# Patient Record
Sex: MALE | Race: WHITE | NOT HISPANIC OR LATINO | Employment: UNEMPLOYED | URBAN - METROPOLITAN AREA
[De-identification: names, ages, dates, MRNs, and addresses within clinical notes are randomized per-mention and may not be internally consistent; named-entity substitution may affect disease eponyms.]

---

## 2017-01-12 ENCOUNTER — DOCUMENTATION ONLY (OUTPATIENT)
Dept: ENDOCRINOLOGY | Facility: CLINIC | Age: 52
End: 2017-01-12

## 2017-01-12 LAB
ALBUMIN URINE MG/G CR: 4 MG/G CREATININE
ALBUMIN URINE MG/SPEC: 2
CHOLEST SERPL-MCNC: 163 MG/DL
CREAT SERPL-MCNC: 0.76 MG/DL
CREATININE URINE: 50
GFR SERPL CREATININE-BSD FRML MDRD: >90 ML/MIN/1.73M2
HBA1C MFR BLD: 6.4 % (ref 0–5.7)
HDLC SERPL-MCNC: 40 MG/DL
LDLC SERPL CALC-MCNC: 78 MG/DL
NONHDLC SERPL-MCNC: NORMAL MG/DL
TRIGL SERPL-MCNC: 227 MG/DL

## 2017-01-12 NOTE — PROGRESS NOTES
GRADE study visit    Subject is here for 36 month GRADE study visit. Patient is doing well.  Subject reached primary and secondary endpoint and now on Lantus insulin per protocol.  Continues on Metformin 1000 mg bid in addition to randomized treatment of Sitagliptin 100 mg daily and Lantus insulin 50 units daily.      Last A1c - 5.4%    Lab results:   A1c: 6.4  Ur alb/cr = 4  Creatinine = 0.76  Chol 163  Trig 227  HDL  40  LDL  78      Plan:   1. Diabetes- A1c in good range.  Will continue current regimen.   2. Follow up in 3 months, sooner with concerns.        Narinder Ricardo MD  [facility]  Department of Medicine  Division of Endocrinology and Diabetes

## 2017-03-16 LAB — HBA1C MFR BLD: 6.1 % (ref 4.3–6)

## 2017-03-28 ENCOUNTER — DOCUMENTATION ONLY (OUTPATIENT)
Dept: RESEARCH | Facility: CLINIC | Age: 52
End: 2017-03-28

## 2017-06-20 ENCOUNTER — DOCUMENTATION ONLY (OUTPATIENT)
Dept: ENDOCRINOLOGY | Facility: CLINIC | Age: 52
End: 2017-06-20

## 2017-06-20 DIAGNOSIS — Z00.6 RESEARCH SUBJECT: Primary | ICD-10-CM

## 2017-06-20 NOTE — PROGRESS NOTES
GRADE study visit     Subject is here for 42 month GRADE study visit. Patient is doing well.  Subject reached primary and secondary endpoint and now on Lantus insulin per protocol.  Continues on Metformin 1000 mg bid in addition to randomized treatment of Sitagliptin 100 mg daily and Lantus insulin 42 units daily.  No sx of hypoglycemia. Has not been checking bs regularly in AM.     Lab results:   A1c:  Pending     Plan:   1. Diabetes- Will continue current regimen.   2. Follow up in 3 months, sooner with concerns.           Narinder Ricardo MD  [facility]  Department of Medicine  Division of Endocrinology and Diabetes

## 2017-09-25 ENCOUNTER — DOCUMENTATION ONLY (OUTPATIENT)
Dept: ENDOCRINOLOGY | Facility: CLINIC | Age: 52
End: 2017-09-25

## 2017-09-25 LAB — HBA1C MFR BLD: 7 % (ref 0–5.7)

## 2017-09-25 NOTE — PROGRESS NOTES
GRADE study visit    Patient is here for 45 month GRADE study visit. Patient is doing well and continues on Metformin 1000 mg bid in addition to randomized treatment of sitagliptin.  Lantus was added and he is currently on 43 units daily.  Patient is tolerating medications and has no concerns.  Doesn't have low glucose sx.        Plan:   1.  Diabetes- A1c pending today.  Continue current regimen for now with plan to alter as needed based on A1c.    2.  Follow up in 3 months.     Kalani Snider MD  [facility]  Department of Medicine  Division of Endocrinology and Diabetes

## 2017-12-07 ENCOUNTER — DOCUMENTATION ONLY (OUTPATIENT)
Dept: ENDOCRINOLOGY | Facility: CLINIC | Age: 52
End: 2017-12-07

## 2017-12-07 LAB
CHOLEST SERPL-MCNC: 166 MG/DL
CREAT SERPL-MCNC: 0.88 MG/DL
HBA1C MFR BLD: 7.1 % (ref 0–5.7)
HDLC SERPL-MCNC: 40 MG/DL
LDL CHOLESTEROL: 94 MG/DL
Lab: 6.82 MG/G CREATININE (ref 0–25)
TRIGL SERPL-MCNC: 164 MG/DL

## 2017-12-07 NOTE — PROGRESS NOTES
GRADE study visit    Patient is here for 48 month GRADE study visit. Patient is doing well and continues on Metformin 1000 mg bid in addition to randomized treatment of Sitagliptin 100 mg daily.  He was started on glargine when his a1c was at 8.6%, and has been doing well.  He is now on 47 units daily. His last a1c had climbed to 7.0%.  He has not been testing his blood sugars. He is still unemployed and is job hunting.  Eating more when he is home, but he has not gained weight.  He is not reporting hypoglycemia.  He is tolerating medications and has been having no side effects.      Lab results:   A1c:7.1%  LDL- 94  Creatinine- 0.88  Albumin:creatinine ratio 6.82    Plan:   1.  Diabetes- A1c is slightly above target.  Encouraged him to test in the mornings and call with numbers next week.  He thinks some accountability will help him.  No changes today..    2.  Follow up in 3 months.     Emma Martin PA-C, MPAS   [facility]  Department of Medicine  Division of Endocrinology and Diabetes

## 2018-03-08 ENCOUNTER — DOCUMENTATION ONLY (OUTPATIENT)
Dept: ENDOCRINOLOGY | Facility: CLINIC | Age: 53
End: 2018-03-08

## 2018-03-08 LAB — HBA1C MFR BLD: 6.4 % (ref 0–5.7)

## 2018-03-08 NOTE — PROGRESS NOTES
GRADE study visit    Patient is here for 48 month [facility] study visit. Patient is doing well and continues on Metformin 1000 mg bid in addition to randomized treatment of Sitagliptin 100 mg daily.  He was started on glargine when his a1c was at 8.6%, and has been doing well.  He is now on 48 units daily. His last a1c had climbed to 7.1%.  He has not been testing his blood sugars. He is still unemployed and is job hunting.  Eating more when he is home, but he has not gained weight.  He is not reporting hypoglycemia. He has been having pain in his feet and legs that sometimes wakes him up at night.  He is tolerating medications and has been having no side effects.      Lab results:   Hemoglobin a1c: 6.4%     Plan:   1.  Diabetes- A1c is at target. Spent much time discussing dietary interventions including increasing fruits/vegetables/whole grains and reducing processed meats.  No changes today.  2.  Foot pain- normal monofilament and vibratory sensation.  Encouraged more walking and f/u with PCP if pain persists.  He has been prescribed gabapentin, but unclear if he is taking it.  Will explore.     3.  Follow up in 3 months.     Emma Martin PA-C, MPAS   [facility]  Department of Medicine  Division of Endocrinology and Diabetes

## 2018-06-28 ENCOUNTER — DOCUMENTATION ONLY (OUTPATIENT)
Dept: ENDOCRINOLOGY | Facility: CLINIC | Age: 53
End: 2018-06-28

## 2018-06-28 NOTE — PROGRESS NOTES
GRADE study visit    Patient is here for 54 month [facility] study visit. Patient is doing well and continues on Metformin 1000 mg bid in addition to randomized treatment of Sitagliptin 100 mg daily.  He was started on glargine when his a1c was at 8.6%, and has been doing well.  He is now on 47 units daily. His last a1c was 6.4%. He has been feeling very down and finds it hard to get motivated.  He still has not found a job.  He is on an antidepressant for the past 20 years (same one).  His therapist retired and he has not seen anyone in over a year.  He is not suicidal.     Lab results:   Hemoglobin a1c: 6.8%  Albumin:creatinine ratio: 1.96    Plan:   1.  Diabetes- A1c is at target. Spent much time discussing dietary interventions including increasing fruits/vegetables/whole grains and reducing processed meats.  No changes today.  2. Depression- strongly encouraged him to follow up with psychologist/psychiatrist.  Reminded him depression is very common in diabetes and very treatable. He assures me he is not suicidal.     3.  Follow up in 3 months.     Emma Martin PA-C, MPAS   [facility]  Department of Medicine  Division of Endocrinology and Diabetes

## 2018-06-29 LAB — HBA1C MFR BLD: 6.8 % (ref 0–5.7)

## 2018-10-04 ENCOUNTER — DOCUMENTATION ONLY (OUTPATIENT)
Dept: ENDOCRINOLOGY | Facility: CLINIC | Age: 53
End: 2018-10-04

## 2018-10-04 NOTE — PROGRESS NOTES
GRADE study visit    Patient is here for 57 month [facility] study visit. Patient is doing well and continues on Metformin 1000 mg bid in addition to randomized treatment of Sitagliptin 100 mg daily.  He was started on glargine when his a1c was at 8.6%, and has been doing well.  He is now on 47 units daily. His last a1c was 6.8%. He has been feeling very down and finds it hard to get motivated.  He still has not found a job. He recently met with his primary physician and added more medication for his depression.  He is starting it this week.  He continues exercising, walking at least 30 minutes per day.  He wonders if he would benefit from a nicanor sensor.     Lab results:   Hemoglobin a1c: 7.7%    Plan:   1.  Diabetes- A1c is higher at 7.7%.  Previous a1c was 6.8%. Encouraged continued lifestyle changes and reducing time being sedentary (walking breaks every 30 minutes).  Suggested a nicanor would be very helpful for him, but he is not sure about insurance coverage.  Suggested he ask PCP to prescribe it.  For now, will increase Lantus to 50 units.   2. Depression- He is following with his PCP with recent med change.  Encouraged him to establish care with a therapist.    3.  Follow up in 3 months.     Emma Martin PA-C, MPAS   [facility]  Department of Medicine  Division of Endocrinology and Diabetes

## 2018-10-05 LAB — HBA1C MFR BLD: 7.7 % (ref 0–5.6)

## 2019-01-23 ENCOUNTER — DOCUMENTATION ONLY (OUTPATIENT)
Dept: ENDOCRINOLOGY | Facility: CLINIC | Age: 54
End: 2019-01-23

## 2019-01-23 NOTE — PROGRESS NOTES
GRADE study visit    Patient is here for 60 month GRADE study visit. Patient is doing well and continues on Metformin 1000 mg bid in addition to randomized treatment of Sitagliptin 100 mg daily.  He was started on glargine when his a1c was at 8.6%, and has been doing well.  He is now on 50 units daily that was increased at the last visit. His last a1c was 7.7%. He continues to feel down but stable as he has not found a job. Last seen by PCP in August 2018. He continues exercising, walking at least 30 minutes per day.  He checked blood glucoses 2-3 times per week.    Lab results:   Hemoglobin a1c:7.0%  , LDL 38, Chol 209, HDL 38, Cr 0.79  Urine microalbumin 3    Plan:   1.  Diabetes- A1c is 7.0%.  Previous a1c was 7.7%. Discussed with him on checking BG more frequent and possible to get on personal Karen. Encourage him to discus with PCP re: Karen sensor. Encouraged continued lifestyle changes    2. Depression- He is following with his PCP.  Encouraged him to establish care with a therapist.    3. Hyperlipidemia: increased from last year. Encourage him to work on diet and exercise.   3.  Follow up in 3 months.     Monica Genao MD    Division of Diabetes and Endocrinology  Department of Medicine  [phone]

## 2019-01-31 LAB
ALBUMIN URINE MG/G CR: 5.77 MG/G CREATININE
ALBUMIN URINE MG/SPEC: NORMAL
CHOLEST SERPL-MCNC: 209 MG/DL (ref 0–200)
CREAT SERPL-MCNC: 0.79 MG/DL (ref 0.5–1.2)
CREATININE URINE: NORMAL
GFR SERPL CREATININE-BSD FRML MDRD: >90 ML/MIN/1.73M2
HBA1C MFR BLD: 7 % (ref 0–5.6)
HDLC SERPL-MCNC: 38 MG/DL
LDLC SERPL CALC-MCNC: 118 MG/DL (ref 0–129)
NONHDLC SERPL-MCNC: ABNORMAL MG/DL
TRIGL SERPL-MCNC: 268 MG/DL

## 2019-04-15 ENCOUNTER — DOCUMENTATION ONLY (OUTPATIENT)
Dept: ENDOCRINOLOGY | Facility: CLINIC | Age: 54
End: 2019-04-15

## 2019-04-15 LAB — HBA1C MFR BLD: 7.5 % (ref 0–5.6)

## 2019-04-15 NOTE — PROGRESS NOTES
GRADE study visit    Patient is here for 63 month GRADE study visit. Patient is doing well and continues on Metformin 1000 mg bid in addition to randomized treatment of januvia. He is also on lantus 50 units daily..  Patient is tolerating medications and has no concerns.  Hasn't had low bg. AM sugars 110-120.    121/71      Plan:   1.  Diabetes- A1c pending today.  Continue current regimen for now with plan to alter as needed based on A1c.    2.  Follow up in 3 months.     Kalani Snider MD  [facility]  Department of Medicine  Division of Endocrinology and Diabetes

## 2019-06-25 ENCOUNTER — DOCUMENTATION ONLY (OUTPATIENT)
Dept: ENDOCRINOLOGY | Facility: CLINIC | Age: 54
End: 2019-06-25

## 2019-06-25 DIAGNOSIS — Z00.6 RESEARCH SUBJECT: ICD-10-CM

## 2019-06-25 NOTE — PROGRESS NOTES
GRADE study visit     Subject is here for 66 month GRADE study visit. Patient is doing well and continues on Metformin 1000 mg bid in addition to randomized treatment of januvia. He is also on lantus 50 units daily..      Patient is tolerating medications and has no concerns.  Hasn't had low bg. AM sugars 110-115.     Last A1c = 7.5%    Lab today    A1c = 7.6%    Ur alb/cr  3.08        Plan:   1.  Diabetes- Goal to keep A1c < 7.5 and around 7% optimally.  Have some room to incr lantus - incr lantus to 55 units daily.  2.  Follow up in 3 months.     RADHA Ricardo MD

## 2019-07-02 LAB
ALBUMIN URINE MG/G CR: 3.08 MG/G CREATININE
ALBUMIN URINE MG/SPEC: 2
CREATININE URINE: 65
HBA1C MFR BLD: 7.6 % (ref 0–5.6)

## 2019-09-24 ENCOUNTER — DOCUMENTATION ONLY (OUTPATIENT)
Dept: ENDOCRINOLOGY | Facility: CLINIC | Age: 54
End: 2019-09-24

## 2019-09-24 DIAGNOSIS — Z00.6 RESEARCH SUBJECT: ICD-10-CM

## 2019-09-24 NOTE — PROGRESS NOTES
GRADE study visit     Subject is here for 69 month GRADE study visit. Patient is doing well and continues on Metformin 1000 mg bid in addition to randomized treatment of januvia. He is also on lantus - we incr dose after last visit, now on 57 units daily..       Patient is tolerating medications and has no concerns.  Hasn't had low bg but checking fasting bs infreq, when does check says they are around 115 - 120.     Last A1c = 7.6%     Lab today     A1c = 8.2       Plan:   1.  Diabetes- Will incr lantus dose to 65 units daily.  Goal to keep A1c < 7.5   2.  Follow up in 3 months.      RADHA Ricardo MD

## 2019-10-01 LAB — HBA1C MFR BLD: 8.2 % (ref 0–5.6)

## 2019-12-11 ENCOUNTER — DOCUMENTATION ONLY (OUTPATIENT)
Dept: ENDOCRINOLOGY | Facility: CLINIC | Age: 54
End: 2019-12-11

## 2019-12-11 NOTE — PROGRESS NOTES
GRADE study visit     Subject is here for 72 month GRADE study visit. Patient is doing well and continues on Metformin 1000 mg bid in addition to randomized treatment of Januvia. He is also on lantus 65 units daily.     Patient is tolerating medications and has no concerns. He checked FBG ranged 120. He has not checked BG before before bedtime or after meal.    He reports that his diet is not good particularly at lunch when he usually eats out at fast food restaurant. His wife cooks dinner which is usually healthy. He does not walk routinely.     Last A1c = 8.2%     Lab today     Lab Results   Component Value Date    A1C 7.5 12/11/2019        Plan:   1.  Diabetes- A1c today is 7.5% improving. Goal to keep A1c < 7.5. continue current regimen. Encourage diet modification and exercise  2.  Follow up in 3 months.      Monica Genao MD  Division of Diabetes and Endocrinology  Department of Medicine

## 2019-12-12 LAB
ALBUMIN URINE MG/G CR: 8.27 MG/G CREATININE
ALBUMIN URINE MG/SPEC: NORMAL
CHOLEST SERPL-MCNC: 219 MG/DL
CHOLEST/HDLC SERPL: NORMAL {RATIO}
CREAT SERPL-MCNC: 0.79 MG/DL
CREATININE URINE: NORMAL
GFR SERPL CREATININE-BSD FRML MDRD: NORMAL ML/MIN/{1.73_M2}
HBA1C MFR BLD: 7.5 % (ref 0–5.6)
HDLC SERPL-MCNC: 34 MG/DL
LDLC SERPL CALC-MCNC: NORMAL MG/DL
TRIGL SERPL-MCNC: 481 MG/DL

## 2020-04-30 LAB — HBA1C MFR BLD: 6.4 % (ref 0–5.6)

## 2020-05-13 ENCOUNTER — DOCUMENTATION ONLY (OUTPATIENT)
Dept: ENDOCRINOLOGY | Facility: CLINIC | Age: 55
End: 2020-05-13

## 2020-05-13 NOTE — PROGRESS NOTES
[facility] study visit     Talked with subject over the phone due to COVID outbreak for 75 month [facility] study visit. Patient is doing well and continues on Metformin 1000 mg bid in addition to randomized treatment of Januvia. He is also on lantus 65 units daily.     Patient is tolerating medications and has no concerns. He checked FBG ranged 110-120. No hypoglycemia.    He reports working on exercising at the fitness 4-5 times per week.     Last A1c = 7.5%     Lab today  Lab Results   Component Value Date    A1C 6.4 04/29/2020          Plan:   1.  Diabetes- A1c today is 6.4% improving. Goal to keep A1c < 7.5. continue current regimen. Encourage diet modification and exercise  2.  Follow up in 3 months.      Monica Genao MD  Division of Diabetes and Endocrinology  Department of Medicine

## 2020-10-07 ENCOUNTER — DOCUMENTATION ONLY (OUTPATIENT)
Dept: ENDOCRINOLOGY | Facility: CLINIC | Age: 55
End: 2020-10-07

## 2020-10-07 LAB
ALBUMIN URINE MG/G CR: 6.98 MG/G CREATININE
ALBUMIN URINE MG/SPEC: NORMAL
CREATININE URINE: NORMAL
HBA1C MFR BLD: 6.1 % (ref 0–5.6)

## 2020-10-07 NOTE — PROGRESS NOTES
GRADE study visit     Patient is here for a 78 month GRADE study visit. Patient is doing well and continues on Metformin 1000 mg bid in addition to randomized treatment of Januvia. He is also on Lantus 65 units daily.     Patient is tolerating medications and has no concerns. He has not checked BG regularly. No hypoglycemic symptoms.    Last A1c = 6.4%     Lab today  A1c 6.1%     Plan:   1.  Diabetes- A1c . Goal to keep A1c < 7.5. continue current regimen. Encourage diet modification and exercise  2.  Follow up in 3 months.      Monica Genao MD  Division of Diabetes and Endocrinology  Department of Medicine

## 2020-10-30 ENCOUNTER — APPOINTMENT (OUTPATIENT)
Dept: URBAN - METROPOLITAN AREA CLINIC 257 | Age: 55
Setting detail: DERMATOLOGY
End: 2020-10-30

## 2020-10-30 ENCOUNTER — TRANSFERRED RECORDS (OUTPATIENT)
Dept: HEALTH INFORMATION MANAGEMENT | Facility: CLINIC | Age: 55
End: 2020-10-30

## 2020-10-30 DIAGNOSIS — R21 RASH AND OTHER NONSPECIFIC SKIN ERUPTION: ICD-10-CM

## 2020-10-30 DIAGNOSIS — R20.8 OTHER DISTURBANCES OF SKIN SENSATION: ICD-10-CM

## 2020-10-30 DIAGNOSIS — L57.8 OTHER SKIN CHANGES DUE TO CHRONIC EXPOSURE TO NONIONIZING RADIATION: ICD-10-CM

## 2020-10-30 PROCEDURE — OTHER BIOPSY BY PUNCH METHOD: OTHER

## 2020-10-30 PROCEDURE — OTHER PRESCRIPTION: OTHER

## 2020-10-30 PROCEDURE — 11104 PUNCH BX SKIN SINGLE LESION: CPT

## 2020-10-30 PROCEDURE — 99203 OFFICE O/P NEW LOW 30 MIN: CPT | Mod: 25

## 2020-10-30 PROCEDURE — OTHER MIPS QUALITY: OTHER

## 2020-10-30 PROCEDURE — OTHER COUNSELING: OTHER

## 2020-10-30 RX ORDER — TRIAMCINOLONE ACETONIDE 1 MG/G
CREAM TOPICAL BID
Qty: 1 | Refills: 3 | Status: ERX | COMMUNITY
Start: 2020-10-30

## 2020-10-30 ASSESSMENT — LOCATION DETAILED DESCRIPTION DERM
LOCATION DETAILED: RIGHT CENTRAL MALAR CHEEK
LOCATION DETAILED: LEFT INFERIOR POSTERIOR NECK

## 2020-10-30 ASSESSMENT — LOCATION SIMPLE DESCRIPTION DERM
LOCATION SIMPLE: RIGHT CHEEK
LOCATION SIMPLE: POSTERIOR NECK

## 2020-10-30 ASSESSMENT — LOCATION ZONE DERM
LOCATION ZONE: NECK
LOCATION ZONE: FACE

## 2020-10-30 NOTE — PROCEDURE: BIOPSY BY PUNCH METHOD
Patient Will Remove Sutures At Home?: No
X Depth Of Punch In Cm (Optional): 0
Anesthesia Type: 1% lidocaine with epinephrine
Dressing: bandage
Billing Type: Third-Party Bill
Detail Level: Detailed
Punch Size In Mm: 4
Wound Care: Petrolatum
Render Post-Care Instructions In Note?: yes
Body Location Override (Optional - Billing Will Still Be Based On Selected Body Map Location If Applicable): left posterior neck
Post-Care Instructions: I reviewed with the patient in detail post-care instructions. Patient is to keep the biopsy site dry overnight, and then apply bacitracin twice daily until healed. Patient may apply hydrogen peroxide soaks to remove any crusting.
Information: Selecting Yes will display possible errors in your note based on the variables you have selected. This validation is only offered as a suggestion for you. PLEASE NOTE THAT THE VALIDATION TEXT WILL BE REMOVED WHEN YOU FINALIZE YOUR NOTE. IF YOU WANT TO FAX A PRELIMINARY NOTE YOU WILL NEED TO TOGGLE THIS TO 'NO' IF YOU DO NOT WANT IT IN YOUR FAXED NOTE.
Hemostasis: None
Anesthesia Volume In Cc (Will Not Render If 0): 0.5
Epidermal Sutures: gel foam
Consent: Written consent was obtained and risks were reviewed including but not limited to scarring, infection, bleeding, scabbing, incomplete removal, nerve damage and allergy to anesthesia.
Home Suture Removal Text: Patient was provided a home suture removal kit and will remove their sutures at home.  If they have any questions or difficulties they will call the office.
Size Of Lesion In Cm (Optional): 0.4
Biopsy Type: H and E
Notification Instructions: Patient will be notified of biopsy results. However, patient instructed to call the office if not contacted within 2 weeks.

## 2020-10-30 NOTE — PROCEDURE: COUNSELING
Patient Specific Counseling (Will Not Stick From Patient To Patient): \\nI suspect this is sclerodema (not scleroderma) which can be associated with DM.  We will biopsy to confirm.  Will start triam 0.1% bid for now empirically.  It does not seem to bother him much, but if it progressed or became more symptomatic, we could entertain tranilast (0.3 g/day), which has been shown in some reports to improve thickness/induration.
Detail Level: Detailed
Detail Level: Simple

## 2020-10-30 NOTE — PROCEDURE: MIPS QUALITY
Quality 130: Documentation Of Current Medications In The Medical Record: Current Medications Documented
Quality 226: Preventive Care And Screening: Tobacco Use: Screening And Cessation Intervention: Patient screened for tobacco use and is an ex/non-smoker
Quality 111:Pneumonia Vaccination Status For Older Adults: Pneumococcal Vaccination not Administered or Previously Received, Reason not Otherwise Specified
Detail Level: Detailed
Quality 110: Preventive Care And Screening: Influenza Immunization: Influenza Immunization Ordered or Recommended, but not Administered due to system reason
Quality 431: Preventive Care And Screening: Unhealthy Alcohol Use - Screening: Patient screened for unhealthy alcohol use using a single question and scores less than 2 times per year

## 2020-11-11 ENCOUNTER — APPOINTMENT (OUTPATIENT)
Dept: URBAN - METROPOLITAN AREA CLINIC 259 | Age: 55
Setting detail: DERMATOLOGY
End: 2020-11-11

## 2020-11-11 ENCOUNTER — TRANSFERRED RECORDS (OUTPATIENT)
Dept: HEALTH INFORMATION MANAGEMENT | Facility: CLINIC | Age: 55
End: 2020-11-11

## 2020-11-11 DIAGNOSIS — L94.0 LOCALIZED SCLERODERMA [MORPHEA]: ICD-10-CM

## 2020-11-11 PROCEDURE — OTHER VENIPUNCTURE: OTHER

## 2020-11-11 PROCEDURE — 36415 COLL VENOUS BLD VENIPUNCTURE: CPT

## 2020-11-11 PROCEDURE — OTHER ORDER TESTS: OTHER

## 2021-01-06 ENCOUNTER — DOCUMENTATION ONLY (OUTPATIENT)
Dept: ENDOCRINOLOGY | Facility: CLINIC | Age: 56
End: 2021-01-06

## 2021-01-06 NOTE — PROGRESS NOTES
GRADE study final visit     Patient is here for a 84 month and final study visit. Patient is doing well and continues on Metformin 1000 mg bid in addition to randomized treatment of Januvia. He is also on Lantus 65 units daily. He does not check BG often, only once a week. BG ~120.     Patient is tolerating medications and has no concerns. No hypoglycemic symptoms.    He has not walked much during winter. Diet is not good during holiday.    Last A1c = 6.1%     Lab today  A1c 7.6%     Plan:   1.  Diabetes- A1c is up. Goal to keep A1c < 7.5. continue current regimen. Encourage diet modification and exercise  2.  Follow up with PCP in one month     Monica Genao MD  Division of Diabetes and Endocrinology  Department of Medicine

## 2021-01-07 LAB
CHOLEST SERPL-MCNC: 228 MG/DL (ref 0–200)
CREAT SERPL-MCNC: 0.76 MG/DL (ref 0.67–1.17)
GFR SERPL CREATININE-BSD FRML MDRD: >90 ML/MIN/1.73M2
HBA1C MFR BLD: 7.6 % (ref 0–5.6)
HDLC SERPL-MCNC: 40 MG/DL
LDLC SERPL CALC-MCNC: 131 MG/DL (ref 0–129)
NONHDLC SERPL-MCNC: ABNORMAL MG/DL
TRIGL SERPL-MCNC: 283 MG/DL

## 2021-01-14 LAB
ALBUMIN URINE MG/G CR: 5.13 MG/G CREATININE
ALBUMIN URINE MG/SPEC: NORMAL
CREATININE URINE: NORMAL

## 2021-10-21 ENCOUNTER — APPOINTMENT (OUTPATIENT)
Dept: URBAN - METROPOLITAN AREA CLINIC 257 | Age: 56
Setting detail: DERMATOLOGY
End: 2021-10-21

## 2021-10-21 ENCOUNTER — TRANSFERRED RECORDS (OUTPATIENT)
Dept: HEALTH INFORMATION MANAGEMENT | Facility: CLINIC | Age: 56
End: 2021-10-21

## 2021-10-21 DIAGNOSIS — D17 BENIGN LIPOMATOUS NEOPLASM: ICD-10-CM

## 2021-10-21 DIAGNOSIS — L94.0 LOCALIZED SCLERODERMA [MORPHEA]: ICD-10-CM

## 2021-10-21 PROBLEM — D17.21 BENIGN LIPOMATOUS NEOPLASM OF SKIN AND SUBCUTANEOUS TISSUE OF RIGHT ARM: Status: ACTIVE | Noted: 2021-10-21

## 2021-10-21 PROCEDURE — OTHER PRESCRIPTION: OTHER

## 2021-10-21 PROCEDURE — 99214 OFFICE O/P EST MOD 30 MIN: CPT

## 2021-10-21 PROCEDURE — OTHER COUNSELING: OTHER

## 2021-10-21 PROCEDURE — OTHER MIPS QUALITY: OTHER

## 2021-10-21 RX ORDER — TRIAMCINOLONE ACETONIDE 1 MG/G
CREAM TOPICAL BID
Qty: 454 | Refills: 3 | Status: ERX | COMMUNITY
Start: 2021-10-21

## 2021-10-21 ASSESSMENT — LOCATION ZONE DERM
LOCATION ZONE: ARM
LOCATION ZONE: NECK

## 2021-10-21 ASSESSMENT — LOCATION SIMPLE DESCRIPTION DERM
LOCATION SIMPLE: POSTERIOR NECK
LOCATION SIMPLE: RIGHT FOREARM

## 2021-10-21 ASSESSMENT — LOCATION DETAILED DESCRIPTION DERM
LOCATION DETAILED: RIGHT VENTRAL PROXIMAL FOREARM
LOCATION DETAILED: MID POSTERIOR NECK

## 2021-10-21 NOTE — PROCEDURE: MIPS QUALITY
Quality 130: Documentation Of Current Medications In The Medical Record: Current Medications Documented
Quality 431: Preventive Care And Screening: Unhealthy Alcohol Use - Screening: Patient not identified as an unhealthy alcohol user when screened for unhealthy alcohol use using a systematic screening method
Quality 226: Preventive Care And Screening: Tobacco Use: Screening And Cessation Intervention: Patient screened for tobacco use and is an ex/non-smoker
Quality 110: Preventive Care And Screening: Influenza Immunization: Influenza Immunization Ordered or Recommended, but not Administered due to system reason
Detail Level: Detailed

## 2022-01-12 ENCOUNTER — TRANSFERRED RECORDS (OUTPATIENT)
Dept: MULTI SPECIALTY CLINIC | Facility: CLINIC | Age: 57
End: 2022-01-12

## 2022-01-12 LAB — RETINOPATHY: NORMAL

## 2022-07-19 ENCOUNTER — OFFICE VISIT (OUTPATIENT)
Dept: FAMILY MEDICINE | Facility: CLINIC | Age: 57
End: 2022-07-19
Payer: COMMERCIAL

## 2022-07-19 VITALS
WEIGHT: 200 LBS | HEART RATE: 92 BPM | DIASTOLIC BLOOD PRESSURE: 75 MMHG | SYSTOLIC BLOOD PRESSURE: 122 MMHG | HEIGHT: 69 IN | TEMPERATURE: 97.9 F | OXYGEN SATURATION: 99 % | BODY MASS INDEX: 29.62 KG/M2

## 2022-07-19 DIAGNOSIS — E11.9 TYPE 2 DIABETES MELLITUS WITHOUT COMPLICATION, WITHOUT LONG-TERM CURRENT USE OF INSULIN (H): ICD-10-CM

## 2022-07-19 DIAGNOSIS — G47.33 OBSTRUCTIVE SLEEP APNEA SYNDROME: ICD-10-CM

## 2022-07-19 DIAGNOSIS — R94.31 ABNORMAL ELECTROCARDIOGRAM: ICD-10-CM

## 2022-07-19 DIAGNOSIS — M75.101 TEAR OF RIGHT ROTATOR CUFF, UNSPECIFIED TEAR EXTENT, UNSPECIFIED WHETHER TRAUMATIC: ICD-10-CM

## 2022-07-19 DIAGNOSIS — Z01.818 PREOP EXAM FOR INTERNAL MEDICINE: Primary | ICD-10-CM

## 2022-07-19 PROBLEM — I82.621 ACUTE DEEP VEIN THROMBOSIS (DVT) OF RIGHT UPPER EXTREMITY (H): Status: ACTIVE | Noted: 2022-07-19

## 2022-07-19 LAB
ANION GAP SERPL CALCULATED.3IONS-SCNC: 9 MMOL/L (ref 3–14)
BASOPHILS # BLD AUTO: 0 10E3/UL (ref 0–0.2)
BASOPHILS NFR BLD AUTO: 1 %
BUN SERPL-MCNC: 16 MG/DL (ref 7–30)
CALCIUM SERPL-MCNC: 9.8 MG/DL (ref 8.5–10.1)
CHLORIDE BLD-SCNC: 99 MMOL/L (ref 94–109)
CO2 SERPL-SCNC: 24 MMOL/L (ref 20–32)
CREAT SERPL-MCNC: 0.83 MG/DL (ref 0.66–1.25)
EOSINOPHIL # BLD AUTO: 0.2 10E3/UL (ref 0–0.7)
EOSINOPHIL NFR BLD AUTO: 4 %
ERYTHROCYTE [DISTWIDTH] IN BLOOD BY AUTOMATED COUNT: 12.9 % (ref 10–15)
GFR SERPL CREATININE-BSD FRML MDRD: >90 ML/MIN/1.73M2
GLUCOSE BLD-MCNC: 513 MG/DL (ref 70–99)
HBA1C MFR BLD: 12.3 % (ref 0–5.6)
HCT VFR BLD AUTO: 44.9 % (ref 40–53)
HGB BLD-MCNC: 15.3 G/DL (ref 13.3–17.7)
IMM GRANULOCYTES # BLD: 0 10E3/UL
IMM GRANULOCYTES NFR BLD: 1 %
LYMPHOCYTES # BLD AUTO: 1.6 10E3/UL (ref 0.8–5.3)
LYMPHOCYTES NFR BLD AUTO: 33 %
MCH RBC QN AUTO: 30.8 PG (ref 26.5–33)
MCHC RBC AUTO-ENTMCNC: 34.1 G/DL (ref 31.5–36.5)
MCV RBC AUTO: 91 FL (ref 78–100)
MONOCYTES # BLD AUTO: 0.4 10E3/UL (ref 0–1.3)
MONOCYTES NFR BLD AUTO: 8 %
NEUTROPHILS # BLD AUTO: 2.7 10E3/UL (ref 1.6–8.3)
NEUTROPHILS NFR BLD AUTO: 54 %
PLATELET # BLD AUTO: 272 10E3/UL (ref 150–450)
POTASSIUM BLD-SCNC: 4.6 MMOL/L (ref 3.4–5.3)
RBC # BLD AUTO: 4.96 10E6/UL (ref 4.4–5.9)
SODIUM SERPL-SCNC: 132 MMOL/L (ref 133–144)
WBC # BLD AUTO: 5 10E3/UL (ref 4–11)

## 2022-07-19 PROCEDURE — 36415 COLL VENOUS BLD VENIPUNCTURE: CPT | Performed by: INTERNAL MEDICINE

## 2022-07-19 PROCEDURE — 80048 BASIC METABOLIC PNL TOTAL CA: CPT | Performed by: INTERNAL MEDICINE

## 2022-07-19 PROCEDURE — 83036 HEMOGLOBIN GLYCOSYLATED A1C: CPT | Performed by: INTERNAL MEDICINE

## 2022-07-19 PROCEDURE — 85025 COMPLETE CBC W/AUTO DIFF WBC: CPT | Performed by: INTERNAL MEDICINE

## 2022-07-19 PROCEDURE — 93000 ELECTROCARDIOGRAM COMPLETE: CPT | Performed by: INTERNAL MEDICINE

## 2022-07-19 PROCEDURE — 99204 OFFICE O/P NEW MOD 45 MIN: CPT | Performed by: INTERNAL MEDICINE

## 2022-07-19 ASSESSMENT — PAIN SCALES - GENERAL: PAINLEVEL: NO PAIN (0)

## 2022-07-19 NOTE — PATIENT INSTRUCTIONS
SCHEDULE Echo (see below)    LABS today    IF the a1c is much above 8 we need to consider additional medication and perhaps postponing surgery for a little while.      ECHO:  [facility]  [phone]  (They're just across the street in the main hospital building)

## 2022-07-19 NOTE — PROGRESS NOTES
[address], [address]  [facility] [address]  Phone: [phone]  Primary Provider: Kelvin Moya  Pre-op Performing Provider: JEREMIAH LOYA      PREOPERATIVE EVALUATION:  Today's date: 7/19/2022    Bro Barton is a 57 year old male who presents for a preoperative evaluation.    Surgical Information:  Surgery/Procedure: Torn Rotator cuff, Biceps - Right Arm  Surgery Location: [facility]  Surgeon: Dr Martin  Surgery Date: 7/26/22  Time of Surgery: TBD  Where patient plans to recover: At home with family  Fax number for surgical facility: [phone]    Type of Anesthesia Anticipated: to be determined    Assessment & Plan     The proposed surgical procedure is considered INTERMEDIATE risk.    Preop exam for internal medicine  Routine labs in process.  His a1c is substantially high at 12.3.  Given this is an elective procedure would like to see evidence of blood sugars under better control prior to surgery.  MTM referral placed.  Input much appreciated.    - BASIC METABOLIC PANEL  - HEMOGLOBIN A1C  - CBC with platelets and differential  - EKG 12-lead complete w/read - Clinics  - BASIC METABOLIC PANEL  - HEMOGLOBIN A1C  - CBC with platelets and differential    Tear of right rotator cuff, unspecified tear extent, unspecified whether traumatic  As above.      Type 2 diabetes mellitus without complication, without long-term current use of insulin (H)  Labs today.  I would want to see him 3 months at the latest for ongoing DM management.  Will work with MTM in the short term.      Obstructive sleep apnea syndrome  On CPAP.  Noted.      Abnormal electrocardiogram  RBB and fascicular block. Will proceed with Echo  - Echocardiogram Complete           Risks and Recommendations:  The patient has the following additional risks and recommendations for perioperative complications:   - No identified additional risk factors other than previously  addressed        RECOMMENDATION:  Surgery is NOT recommended due to uncontrolled diabetes. Stabilization required prior to elective surgery.       54 minutes spent on the date of the encounter doing chart review, history and exam, documentation and further activities per the note        Subjective     HPI related to upcoming procedure:   New to me as well as the clinic and system.    Fell off a ladder last year and tore rotator cuff.      No persona h/o heart disease.  Does have DM.  Routine walking 3 miles every other day    Not checking BS at home.        Preop Questions 7/19/2022   1. Have you ever had a heart attack or stroke? No   2. Have you ever had surgery on your heart or blood vessels, such as a stent placement, a coronary artery bypass, or surgery on an artery in your head, neck, heart, or legs? No   3. Do you have chest pain with activity? No   4. Do you have a history of  heart failure? No   5. Do you currently have a cold, bronchitis or symptoms of other infection? No   6. Do you have a cough, shortness of breath, or wheezing? No   7. Do you or anyone in your family have previous history of blood clots? YES - RUE, spontaneous, no blood thinner   8. Do you or does anyone in your family have a serious bleeding problem such as prolonged bleeding following surgeries or cuts? No   9. Have you ever had problems with anemia or been told to take iron pills? No   10. Have you had any abnormal blood loss such as black, tarry or bloody stools? No   11. Have you ever had a blood transfusion? No   12. Are you willing to have a blood transfusion if it is medically needed before, during, or after your surgery? Yes   13. Have you or any of your relatives ever had problems with anesthesia? No   14. Do you have sleep apnea, excessive snoring or daytime drowsiness? YES - (uses CPAP)   14a. Do you have a CPAP machine? Yes   15. Do you have any artifical heart valves or other implanted medical devices like a pacemaker,  defibrillator, or continuous glucose monitor? No   16. Do you have artificial joints? No   17. Are you allergic to latex? No       Preoperative Review of :   reviewed - no record of controlled substances prescribed.          Review of Systems  Constitutional, neuro, ENT, endocrine, pulmonary, cardiac, gastrointestinal, genitourinary, musculoskeletal, integument and psychiatric systems are negative, except as otherwise noted.    Patient Active Problem List    Diagnosis Date Noted     Acute deep vein thrombosis (DVT) of right upper extremity (H) 07/19/2022     Priority: Medium     Spontaneously.  No blood thinners.  Resolved with time       Obstructive sleep apnea syndrome 09/22/2017     Priority: Medium     Dx about 2008.  Compliant with CPAP       History of kidney stones 09/07/2016     Priority: Medium     Type 2 diabetes mellitus without complication (H) 07/06/2016     Priority: Medium     Dx around 2010. Was in a study until 2022.  During study was on metformin, januvia and lantus.  At present (7/2022 on metformin only)       Major depressive disorder, recurrent episode, moderate (H) 11/10/2009     Priority: Medium      Past Medical History:   Diagnosis Date     Depressive disorder      Diabetes mellitus (H)      Past Surgical History:   Procedure Laterality Date     GENITOURINARY SURGERY       Current Outpatient Medications   Medication Sig Dispense Refill     atorvastatin (LIPITOR) 40 MG tablet Take 1 tablet (40 mg) by mouth daily 90 tablet 3     metFORMIN (GLUCOPHAGE) 500 MG tablet Take 2 tablets (1,000 mg) by mouth 2 times daily (with meals)       PARoxetine (PAXIL) 20 MG tablet Take 1 tablet (20 mg) by mouth every morning 90 tablet 3       No Known Allergies     Social History     Tobacco Use     Smoking status: Never Smoker     Smokeless tobacco: Never Used   Substance Use Topics     Alcohol use: No       History   Drug Use No         Objective     /75 (BP Location: Right arm, Patient Position:  "Sitting, Cuff Size: Adult Regular)   Pulse 92   Temp 97.9  F (36.6  C) (Temporal)   Ht 1.753 m (5' 9\")   Wt 90.7 kg (200 lb)   SpO2 99%   BMI 29.53 kg/m      Physical Exam    GENERAL APPEARANCE: healthy, alert and no distress     EYES: EOMI,  PERRL     HENT: ear canals and TM's normal and nose and mouth without ulcers or lesions     NECK: no adenopathy, no asymmetry, masses, or scars and thyroid normal to palpation     RESP: lungs clear to auscultation - no rales, rhonchi or wheezes     CV: regular rates and rhythm, normal S1 S2, no S3 or S4 and no murmur, click or rub     ABDOMEN:  soft, nontender, no HSM or masses and bowel sounds normal     MS: extremities normal- no gross deformities noted, no evidence of inflammation in joints, FROM in all extremities.     SKIN: no suspicious lesions or rashes     NEURO: Normal strength and tone, sensory exam grossly normal, mentation intact and speech normal     PSYCH: mentation appears normal. and affect normal/bright     LYMPHATICS: No cervical adenopathy    Recent Labs   Lab Test 01/06/21  0000 10/07/20  0000   CR 0.76  --    A1C 7.6* 6.1*        Diagnostics:  Labs pending at this time.  Results will be reviewed when available.   EKG: RBB, anterior block,    Revised Cardiac Risk Index (RCRI):  The patient has the following serious cardiovascular risks for perioperative complications:   - Diabetes Mellitus (on Insulin) = 1 point     RCRI Interpretation: 1 point: Class II (low risk - 0.9% complication rate)           Signed Electronically by: Maciej Hopkins MD  Copy of this evaluation report is provided to requesting physician.      "

## 2022-07-20 ENCOUNTER — MYC MEDICAL ADVICE (OUTPATIENT)
Dept: FAMILY MEDICINE | Facility: CLINIC | Age: 57
End: 2022-07-20

## 2022-07-20 ENCOUNTER — HOSPITAL ENCOUNTER (OUTPATIENT)
Dept: CARDIOLOGY | Facility: CLINIC | Age: 57
Discharge: HOME | End: 2022-07-20
Attending: INTERNAL MEDICINE | Admitting: INTERNAL MEDICINE
Payer: COMMERCIAL

## 2022-07-20 DIAGNOSIS — R94.31 ABNORMAL ELECTROCARDIOGRAM: ICD-10-CM

## 2022-07-20 LAB — LVEF ECHO: NORMAL

## 2022-07-20 PROCEDURE — 93306 TTE W/DOPPLER COMPLETE: CPT | Mod: 26 | Performed by: INTERNAL MEDICINE

## 2022-07-20 PROCEDURE — 255N000002 HC RX 255 OP 636: Performed by: INTERNAL MEDICINE

## 2022-07-20 PROCEDURE — 999N000208 ECHOCARDIOGRAM COMPLETE

## 2022-07-20 RX ADMIN — HUMAN ALBUMIN MICROSPHERES AND PERFLUTREN 3 ML: 10; .22 INJECTION, SOLUTION INTRAVENOUS at 14:24

## 2022-07-22 ENCOUNTER — LAB (OUTPATIENT)
Dept: LAB | Facility: CLINIC | Age: 57
End: 2022-07-22
Payer: COMMERCIAL

## 2022-07-22 ENCOUNTER — OFFICE VISIT (OUTPATIENT)
Dept: PHARMACY | Facility: CLINIC | Age: 57
End: 2022-07-22
Attending: INTERNAL MEDICINE
Payer: COMMERCIAL

## 2022-07-22 VITALS
SYSTOLIC BLOOD PRESSURE: 124 MMHG | DIASTOLIC BLOOD PRESSURE: 82 MMHG | WEIGHT: 200.5 LBS | HEART RATE: 74 BPM | BODY MASS INDEX: 29.61 KG/M2

## 2022-07-22 DIAGNOSIS — F33.1 MAJOR DEPRESSIVE DISORDER, RECURRENT EPISODE, MODERATE (H): ICD-10-CM

## 2022-07-22 DIAGNOSIS — E11.9 TYPE 2 DIABETES MELLITUS WITHOUT COMPLICATION, WITHOUT LONG-TERM CURRENT USE OF INSULIN (H): ICD-10-CM

## 2022-07-22 DIAGNOSIS — E11.9 TYPE 2 DIABETES MELLITUS WITHOUT COMPLICATION, WITHOUT LONG-TERM CURRENT USE OF INSULIN (H): Primary | ICD-10-CM

## 2022-07-22 LAB
CREAT UR-MCNC: 52 MG/DL
MICROALBUMIN UR-MCNC: <5 MG/L
MICROALBUMIN/CREAT UR: NORMAL MG/G{CREAT}

## 2022-07-22 PROCEDURE — 99607 MTMS BY PHARM ADDL 15 MIN: CPT | Performed by: PHARMACIST

## 2022-07-22 PROCEDURE — 99605 MTMS BY PHARM NP 15 MIN: CPT | Performed by: PHARMACIST

## 2022-07-22 PROCEDURE — 82043 UR ALBUMIN QUANTITATIVE: CPT | Performed by: PATHOLOGY

## 2022-07-22 RX ORDER — TIRZEPATIDE 2.5 MG/.5ML
2.5 INJECTION, SOLUTION SUBCUTANEOUS WEEKLY
Qty: 2 ML | Refills: 0 | Status: SHIPPED | OUTPATIENT
Start: 2022-07-22 | End: 2022-08-22

## 2022-07-22 RX ORDER — TIRZEPATIDE 5 MG/.5ML
5 INJECTION, SOLUTION SUBCUTANEOUS WEEKLY
Qty: 2 ML | Refills: 1 | Status: SHIPPED | OUTPATIENT
Start: 2022-07-22 | End: 2022-09-21

## 2022-07-22 RX ORDER — INSULIN GLARGINE 100 [IU]/ML
20 INJECTION, SOLUTION SUBCUTANEOUS AT BEDTIME
Qty: 30 ML | Refills: 1 | Status: SHIPPED | OUTPATIENT
Start: 2022-07-22 | End: 2022-12-27

## 2022-07-22 RX ORDER — VENLAFAXINE HYDROCHLORIDE 150 MG/1
150 CAPSULE, EXTENDED RELEASE ORAL DAILY
COMMUNITY
Start: 2022-06-09 | End: 2023-06-08

## 2022-07-22 RX ORDER — METFORMIN HCL 500 MG
500 TABLET, EXTENDED RELEASE 24 HR ORAL 2 TIMES DAILY WITH MEALS
COMMUNITY
End: 2023-06-08

## 2022-07-22 RX ORDER — TRAZODONE HYDROCHLORIDE 100 MG/1
100 TABLET ORAL AT BEDTIME
COMMUNITY
Start: 2022-06-09 | End: 2023-06-08

## 2022-07-22 RX ORDER — PEN NEEDLE, DIABETIC 32GX 5/32"
NEEDLE, DISPOSABLE MISCELLANEOUS
Qty: 100 EACH | Refills: 3 | Status: SHIPPED | OUTPATIENT
Start: 2022-07-22 | End: 2022-12-30

## 2022-07-22 ASSESSMENT — ANXIETY QUESTIONNAIRES
4. TROUBLE RELAXING: MORE THAN HALF THE DAYS
1. FEELING NERVOUS, ANXIOUS, OR ON EDGE: MORE THAN HALF THE DAYS
GAD7 TOTAL SCORE: 12
IF YOU CHECKED OFF ANY PROBLEMS ON THIS QUESTIONNAIRE, HOW DIFFICULT HAVE THESE PROBLEMS MADE IT FOR YOU TO DO YOUR WORK, TAKE CARE OF THINGS AT HOME, OR GET ALONG WITH OTHER PEOPLE: SOMEWHAT DIFFICULT
2. NOT BEING ABLE TO STOP OR CONTROL WORRYING: MORE THAN HALF THE DAYS
7. FEELING AFRAID AS IF SOMETHING AWFUL MIGHT HAPPEN: SEVERAL DAYS
3. WORRYING TOO MUCH ABOUT DIFFERENT THINGS: MORE THAN HALF THE DAYS
6. BECOMING EASILY ANNOYED OR IRRITABLE: MORE THAN HALF THE DAYS
GAD7 TOTAL SCORE: 12
5. BEING SO RESTLESS THAT IT IS HARD TO SIT STILL: SEVERAL DAYS

## 2022-07-22 ASSESSMENT — PATIENT HEALTH QUESTIONNAIRE - PHQ9: SUM OF ALL RESPONSES TO PHQ QUESTIONS 1-9: 12

## 2022-07-22 NOTE — PATIENT INSTRUCTIONS
"Recommendations from today's MTM visit:                                                    MTM (medication therapy management) is a service provided by a clinical pharmacist designed to help you get the most of out of your medicines.   Today we reviewed what your medicines are for, how to know if they are working, that your medicines are safe and how to make your medicine regimen as easy as possible.      1. Take half an amitriptyline once daily for two weeks, then one half tablet every other day for a week and then stop. Increase again if feeling withdrawal symptoms.   2. Target to decrease carbs to less than 130 grams daily. You can also search for the glycemic index of different foods. A glycemic index above 70 will highly affect blood sugar, and less than 55 will have less of an effect.  3. Start insulin glargine once daily at 20 units. Increase by 2 units every 3 days until fasting blood sugars are less than 130 mg/dL.   4. Start Mounjaro 2.5 mg once weekly for four weeks then increase to 5 mg weekly. Message me if coupon does not work and we can try Ozempic instead.  5. Test blood sugar with Freestyle Karen. If Karen isnt covered I will send a separate blood glucose monitor and I want to test once in the morning before food and 2 hours after your largest meal.     Follow-up: Return in about 1 month (around 8/22/2022) for Follow up, with me, in person.    It was great speaking with you today.  I value your experience and would be very thankful for your time in providing feedback in our clinic survey. In the next few days, you may receive an email or text message from [employer] with a link to a survey related to your  clinical pharmacist.\"     To schedule another MTM appointment, please call the clinic directly or you may call the MTM scheduling line at [phone] or toll-free at [phone].     My Clinical Pharmacist's contact information:                                                      Please feel " free to contact me with any questions or concerns you have.      Ayan Rahman, Pharm. D., [facility]  Medication Therapy Management Pharmacist  Direct Voicemail: [phone]

## 2022-07-22 NOTE — PROGRESS NOTES
Medication Therapy Management (MTM) Encounter    ASSESSMENT:                            Medication Adherence/Access: No issues identified    Type 2 Diabetes: A1c is significantly above less than 7%.  Blood sugars were previously controlled on a combination of metformin, Januvia, 65 units of insulin.  Since he has been off the Januvia and the insulin his blood sugars are a lot higher.  Would not want to reinitiate insulin at 65 unit dose so we will initiate at 20 units daily with a plan to increase every 3 days x 2 units until at goal of less than 130 fasting.  We will also start a GLP 1/GIP agonist to help both lose weight and control blood sugars.  Also counseled the patient on some ways to improve nutrition.  He is also due for microalbumin lab.    Depression/Anxiety: Combination of venlafaxine, amitriptyline, and trazodone increases the patient's risk of developing serotonin syndrome.  He does have moderate depression based off of his PHQ-9 and moderate anxiety based off his ROSEANN-7 score.  We will attempt to address this with therapy at this time and will submit referral for this.  Patient would like to try a trial off of the amitriptyline, and will titrate off slowly.  If he has any side effects from tapering or if his depression or anxiety gets worse while tapering off amitriptyline he should reinstate it.  We will plan to increase Effexor if needed at next visit.    Sleep: Stable.  Difficulty sleeping may be mostly due to shoulder pain.    PLAN:                            1. Take half an amitriptyline once daily for two weeks, then one half tablet every other day for a week and then stop. Increase again if feeling withdrawal symptoms.   2. Target to decrease carbs to less than 130 grams daily. You can also search for the glycemic index of different foods. A glycemic index above 70 will highly affect blood sugar, and less than 55 will have less of an effect.  3. Start insulin glargine once daily at 20 units.  "Increase by 2 units every 3 days until fasting blood sugars are less than 130 mg/dL.   4. Start Mounjaro 2.5 mg once weekly for four weeks then increase to 5 mg weekly. Message me if coupon does not work and we can try Ozempic instead.  5. Test blood sugar with Freestyle Karen. If Karen isnt covered I will send a separate blood glucose monitor and I want to test once in the morning before food and 2 hours after your largest meal.   6.  I submitted a referral for counseling/therapy.    Follow-up: Return in about 1 month (around 8/22/2022) for Follow up, with me, in person.     Plan to get a lipid panel drawn at next visit.     SUBJECTIVE/OBJECTIVE:                          Bro Barton is a 57 year old male coming in for an initial visit. He was referred to me from Maceij Hopkins.      Reason for visit: diabetes management.    Allergies/ADRs: Reviewed in chart  Past Medical History: Reviewed in chart  Tobacco: He reports that he has never smoked. He has never used smokeless tobacco.  Alcohol: rare      Medication Adherence/Access: no issues reported    Type 2 Diabetes:  Currently taking metformin ER 2000 mg. Patient is not experiencing side effects. Has been on Januvia and Ozempic before. Was previously on 65 units of insulin glargine. Also previously used CGM. Stopped most of these medications due to cost concerns. He is currently on a different insurance plan now.   Blood sugar monitoring: never.   Symptoms of low blood sugar? none  Symptoms of high blood sugar? fatigue  Eye exam: up to date  Foot exam: up to date  Diet/Exercise: Diet is \"poor\" not enough fruits and vegetables. Lots of chips and snacks. Walks for an hour 3 days a week and walks about 4 miles each time.     Aspirin: No  Statin: Yes: atorvastatin   ACEi/ARB: No.   Urine Albumin:   Lab Results   Component Value Date    UMALCR  07/22/2022      Comment:      Unable to calculate, urine albumin or creatinine is outside the detectable limits.      Lab " Results   Component Value Date    A1C 12.3 07/19/2022    A1C 7.6 01/06/2021    A1C 6.1 10/07/2020    A1C 6.4 04/29/2020    A1C 7.5 12/11/2019    A1C 8.2 09/24/2019       Recent Labs   Lab Test 01/06/21  0000 12/11/19  0000   CHOL 228* 219   HDL 40 34   * NA   TRIG 283 481       Depression/Anxiety: Currently taking Venlafaxine 150 mg daily, and amitriptyline 25 mg nightly. Reports this was managed by psychiatry previously, and then his old PCP. Reports no one is actively managing now. Wouldn't mind taknig less medications. Reports he used to participate in counseling but no longer is. Would be interested in resuming therapy.  Endorses dry mouth    PHQ 7/22/2022   PHQ-9 Total Score 12   Q9: Thoughts of better off dead/self-harm past 2 weeks Not at all     ROSEANN-7 SCORE 7/22/2022   Total Score 12         Sleep: Currently taking trazodone 100 mg nightly. No concerns or questions at this time.     Today's Vitals: /82   Pulse 74   Wt 200 lb 8 oz (90.9 kg)   BMI 29.61 kg/m    ----------------      I spent 60 minutes with this patient today. All changes were made via collaborative practice agreement with Maciej Hopkins. A copy of the visit note was provided to the patient's provider(s).    The patient was given a summary of these recommendations.     Ayan Rahman, Pharm. D., [facility]  Medication Therapy Management Pharmacist  Direct Voicemail: [phone]       Medication Therapy Recommendations  Major depressive disorder, recurrent episode, moderate (H)    Current Medication: amitriptyline (ELAVIL) 25 MG tablet   Rationale: More effective medication available - Ineffective medication - Effectiveness   Recommendation: Discontinue Medication   Status: Accepted per CPA         Type 2 diabetes mellitus without complication (H)    Current Medication: insulin glargine (LANTUS SOLOSTAR) 100 UNIT/ML pen   Rationale: Synergistic therapy - Needs additional medication therapy - Indication   Recommendation: Start Medication  - Mounjaro 2.5 MG/0.5ML Sopn   Status: Accepted per CPA          Current Medication: metFORMIN (GLUCOPHAGE XR) 500 MG 24 hr tablet   Rationale: Synergistic therapy - Needs additional medication therapy - Indication   Recommendation: Start Medication - INSULIN GLARGINE   Status: Accepted per CPA

## 2022-08-06 ENCOUNTER — HEALTH MAINTENANCE LETTER (OUTPATIENT)
Age: 57
End: 2022-08-06

## 2022-08-22 ENCOUNTER — OFFICE VISIT (OUTPATIENT)
Dept: PHARMACY | Facility: CLINIC | Age: 57
End: 2022-08-22
Payer: COMMERCIAL

## 2022-08-22 VITALS
SYSTOLIC BLOOD PRESSURE: 130 MMHG | BODY MASS INDEX: 30.02 KG/M2 | DIASTOLIC BLOOD PRESSURE: 85 MMHG | WEIGHT: 203.3 LBS | HEART RATE: 84 BPM

## 2022-08-22 DIAGNOSIS — F33.1 MAJOR DEPRESSIVE DISORDER, RECURRENT EPISODE, MODERATE (H): ICD-10-CM

## 2022-08-22 DIAGNOSIS — E11.9 TYPE 2 DIABETES MELLITUS WITHOUT COMPLICATION, WITHOUT LONG-TERM CURRENT USE OF INSULIN (H): Primary | ICD-10-CM

## 2022-08-22 PROCEDURE — 99607 MTMS BY PHARM ADDL 15 MIN: CPT | Performed by: PHARMACIST

## 2022-08-22 PROCEDURE — 99606 MTMS BY PHARM EST 15 MIN: CPT | Performed by: PHARMACIST

## 2022-08-22 RX ORDER — TIRZEPATIDE 7.5 MG/.5ML
7.5 INJECTION, SOLUTION SUBCUTANEOUS WEEKLY
Qty: 2 ML | Refills: 1 | Status: SHIPPED | OUTPATIENT
Start: 2022-08-22 | End: 2022-10-19

## 2022-08-22 NOTE — PATIENT INSTRUCTIONS
"Recommendations from today's MTM visit:                                                         1. When you increase to 5 mg of Mounjaro decrease insulin to 20 units.   2. If you have more than one low blood sugar <80 mg/dL, decrease insulin by 3 units.    3. Look out for an email from [employer] to set up an account.     Follow-up: No follow-ups on file.    It was great speaking with you today.  I value your experience and would be very thankful for your time in providing feedback in our clinic survey. In the next few days, you may receive an email or text message from [employer] with a link to a survey related to your  clinical pharmacist.\"     To schedule another MTM appointment, please call the clinic directly or you may call the MTM scheduling line at [phone] or toll-free at [phone].     My Clinical Pharmacist's contact information:                                                      Please feel free to contact me with any questions or concerns you have.      Ayan Rahman, Pharm. D., BCACP  Medication Therapy Management Pharmacist  Direct Voicemail: [phone]     "

## 2022-08-22 NOTE — PROGRESS NOTES
Medication Therapy Management (MTM) Encounter    ASSESSMENT:                            Medication Adherence/Access: No issues identified    Type 2 Diabetes: Patient is meeting goal of >70% time in target with continuous glucose monitoring.     Depression: Stable.     PLAN:                            1. When you increase to 5 mg of Mounjaro decrease insulin to 20 units.   2. If you have more than one low blood sugar <80 mg/dL, decrease insulin by 3 units.    3. Look out for an email from [employer] to set up an account.     Follow-up: Return in about 30 days (around 9/21/2022) for Follow up, with me, in person.    SUBJECTIVE/OBJECTIVE:                          Bro Barton is a 57 year old male coming in for a follow-up visit.  Today's visit is a follow-up MTM visit from 7/22/22     Reason for visit: diabetes follow-up.    Allergies/ADRs: Reviewed in chart  Past Medical History: Reviewed in chart  Tobacco: He reports that he has never smoked. He has never used smokeless tobacco.      Medication Adherence/Access: no issues reported    Type 2 Diabetes:  Currently taking metformin ER 2000 mg, Lantus 25 units, and Mounjaro 2.5 mg weekly. Patient is not experiencing side effects.    Blood sugar monitoring: CGM. Freestyle: Last 7d: Above 11% In range: 89% and below:0% Last 14d: A:11% In target: 89% below 0%  Symptoms of low blood sugar? Had one low blood glucose on 8/5 in the middle of the night. Did not feel anything.   Symptoms of high blood sugar? fatigue  Eye exam: up to date  Foot exam: up to date  Diet/Exercise: Has been watching carbs and been trying to keep it under 130 g per day.  Aspirin: No  Statin: Yes: atorvastatin   ACEi/ARB: No.   Urine Albumin:           Lab Results   Component Value Date     UMALCR   07/22/2022         Comment:         Unable to calculate, urine albumin or creatinine is outside the detectable limits.            Lab Results   Component Value Date     A1C 12.3 07/19/2022     A1C 7.6  01/06/2021     A1C 6.1 10/07/2020     A1C 6.4 04/29/2020     A1C 7.5 12/11/2019     A1C 8.2 09/24/2019      BP Readings from Last 3 Encounters:   08/22/22 130/85   07/22/22 124/82   07/19/22 122/75        Depression/Anxiety: Currently taking Venlafaxine 150 mg daily. Off amitriptyline and doesn't feel much different. Didn't fix the dry mouth. Reports this was managed by psychiatry previously, and then his old PCP. Reports no one is actively managing now.     PHQ 7/22/2022   PHQ-9 Total Score 12   Q9: Thoughts of better off dead/self-harm past 2 weeks Not at all      ROSEANN-7 SCORE 7/22/2022   Total Score 12        Today's Vitals: /85   Pulse 84   Wt 203 lb 4.8 oz (92.2 kg)   BMI 30.02 kg/m    ----------------      I spent 30 minutes with this patient today. All changes were made via collaborative practice agreement with Kelvin Moya MD. A copy of the visit note was provided to the patient's provider(s).    The patient was sent via [employer] a summary of these recommendations.     Ayan Rahman, Pharm. D., [facility]  Medication Therapy Management Pharmacist  Direct Voicemail: [phone]       Medication Therapy Recommendations  Type 2 diabetes mellitus without complication (H)    Current Medication: Tirzepatide (MOUNJARO) 2.5 MG/0.5ML SOPN (Discontinued)   Rationale: Dose too low - Dosage too low - Effectiveness   Recommendation: Increase Dose   Status: Accepted per CPA

## 2022-09-21 ENCOUNTER — OFFICE VISIT (OUTPATIENT)
Dept: PHARMACY | Facility: CLINIC | Age: 57
End: 2022-09-21
Payer: COMMERCIAL

## 2022-09-21 VITALS — WEIGHT: 199.8 LBS | BODY MASS INDEX: 29.51 KG/M2

## 2022-09-21 DIAGNOSIS — F33.1 MAJOR DEPRESSIVE DISORDER, RECURRENT EPISODE, MODERATE (H): ICD-10-CM

## 2022-09-21 DIAGNOSIS — E11.9 TYPE 2 DIABETES MELLITUS WITHOUT COMPLICATION, WITHOUT LONG-TERM CURRENT USE OF INSULIN (H): Primary | ICD-10-CM

## 2022-09-21 PROCEDURE — 99606 MTMS BY PHARM EST 15 MIN: CPT | Performed by: PHARMACIST

## 2022-09-21 PROCEDURE — 99607 MTMS BY PHARM ADDL 15 MIN: CPT | Performed by: PHARMACIST

## 2022-09-21 RX ORDER — TIRZEPATIDE 10 MG/.5ML
10 INJECTION, SOLUTION SUBCUTANEOUS WEEKLY
Qty: 2 ML | Refills: 1 | Status: SHIPPED | OUTPATIENT
Start: 2022-09-21 | End: 2022-12-27

## 2022-09-21 NOTE — PATIENT INSTRUCTIONS
"Recommendations from today's MTM visit:                                                       1. Increase Mounjaro to 10 mg after 4 doses on 7.5 mg.   2. Decrease Lantus by 3 units if you have a low blood sugar <70 mg/dL.  3. Stop glipizide.  4. Call the mental health schedulers back.     Follow-up: Return in about 4 weeks (around 10/19/2022) for Follow up, with me, in person.    It was great speaking with you today.  I value your experience and would be very thankful for your time in providing feedback in our clinic survey. In the next few days, you may receive an email or text message from [employer] with a link to a survey related to your  clinical pharmacist.\"     To schedule another MTM appointment, please call the clinic directly or you may call the MTM scheduling line at [phone] or toll-free at [phone].     My Clinical Pharmacist's contact information:                                                      Please feel free to contact me with any questions or concerns you have.      Ayan Rahman, Pharm. D., BCACP  Medication Therapy Management Pharmacist  Direct Voicemail: [phone]     "

## 2022-09-21 NOTE — PROGRESS NOTES
"Medication Therapy Management (MTM) Encounter    ASSESSMENT:                            Medication Adherence/Access: No issues identified    Type 2 Diabetes: Patient is meeting goal of >70% time in target with continuous glucose monitoring. Mounjaro has been very helpful for him. He should discontinue glipizide as he reports having some lows. Will plan to taper down on Lantus as Mounjaro increases.     Depression: Recommended patient follow-up with the mental health .       PLAN:                            1. Increase Mounjaro to 10 mg after 4 doses on 7.5 mg.   2. Decrease Lantus by 3 units if you have a low blood sugar <70 mg/dL.  3. Stop glipizide.  4. Call the mental health schedulers back.     Follow-up: Return in about 4 weeks (around 10/19/2022) for Follow up, with me, in person.    SUBJECTIVE/OBJECTIVE:                          Bro Barton is a 57 year old male coming in for a follow-up visit.  Today's visit is a follow-up MTM visit from 8/22/22     Reason for visit: diabetes follow-up.    Allergies/ADRs: Reviewed in chart  Past Medical History: Reviewed in chart  Tobacco: He reports that he has never smoked. He has never used smokeless tobacco.  Alcohol: Not discussed      Medication Adherence/Access: no issues reported    Type 2 Diabetes:  Currently taking metformin ER 2000 mg, Mounjaro 7.5 mg, glipizide XL 5 mg, and Lantus 20 units. Patient has had some stomach upset with each dose increase of Mounjaro but this resolves.  Blood sugar monitoring: CGM: Last 7 days A: 20% IR: 80% below 0%  Last 14 days: Last 14 days A: 16% IR: 84% B: 0%  Symptoms of low blood sugar? none  Symptoms of high blood sugar? fatigue  Eye exam: up to date  Foot exam: up to date  Diet/Exercise: Diet is \"poor\" not enough fruits and vegetables. Lots of chips and snacks. Walks for an hour 3 days a week and walks about 4 miles each time.    (from 8/22 visit - did not discuss today)  Aspirin: No  Statin: Yes: atorvastatin "   ACEi/ARB: No.   Urine Albumin:           Lab Results   Component Value Date     UMALCR   07/22/2022         Comment:         Unable to calculate, urine albumin or creatinine is outside the detectable limits.            Lab Results   Component Value Date     A1C 12.3 07/19/2022     A1C 7.6 01/06/2021     A1C 6.1 10/07/2020     A1C 6.4 04/29/2020     A1C 7.5 12/11/2019     A1C 8.2 09/24/2019           Depression/Anxiety: Currently taking Venlafaxine 150 mg daily. Reports this was managed by psychiatry previously, and then his old PCP. Reports no one is actively managing now. He has not called the mental health  back and is worried about having to tell his story to someone new. Reports he knows he needs to. Taper off amitriptyline was successful. Does not want a change in Effexor at this time.      PHQ 7/22/2022   PHQ-9 Total Score 12   Q9: Thoughts of better off dead/self-harm past 2 weeks Not at all      ROSEANN-7 SCORE 7/22/2022   Total Score 12          Today's Vitals: Wt 199 lb 12.8 oz (90.6 kg)   BMI 29.51 kg/m    ----------------      I spent 30 minutes with this patient today. All changes were made via collaborative practice agreement with Maciej Hopkins. A copy of the visit note was provided to the patient's provider(s).    The patient was given a summary of these recommendations.     Ayan Rahman, Pharm. D., [facility]  Medication Therapy Management Pharmacist  Direct Voicemail: [phone]        Medication Therapy Recommendations  Type 2 diabetes mellitus without complication (H)    Current Medication: Tirzepatide (MOUNJARO) 7.5 MG/0.5ML SOPN   Rationale: Dose too low - Dosage too low - Effectiveness   Recommendation: Increase Dose   Status: Accepted per CPA          Rationale: Unsafe medication for the patient - Adverse medication event - Safety   Recommendation: Discontinue Medication   Status: Accepted per CPA

## 2022-10-03 ENCOUNTER — MYC MEDICAL ADVICE (OUTPATIENT)
Dept: PHARMACY | Facility: CLINIC | Age: 57
End: 2022-10-03

## 2022-10-04 ENCOUNTER — TELEPHONE (OUTPATIENT)
Dept: FAMILY MEDICINE | Facility: CLINIC | Age: 57
End: 2022-10-04

## 2022-10-04 NOTE — TELEPHONE ENCOUNTER
MARILYN PA REQUEST    Prior Authorization Retail Medication Request    Medication/Dose: Mounjaro 10 mg and Mounjaro 12.5 mg  ICD code (if different than what is on RX):    Previously Tried and Failed:  has previously been stable on Mounjaro. Had been on metformin glipizide and lantus as well.  Rationale:  Pharmacy is saying the patient needs another prior authorization for the 10 and 12.5 mg dosages    Insurance Name:    Insurance ID:        Pharmacy Information (if different than what is on RX)  Name:    Phone:

## 2022-10-04 NOTE — TELEPHONE ENCOUNTER
I spoke to pharmacist who states patient was attempting to refill the 7.5 mg dose, not the 10 mg dose. If he is titrating up, he should have them fill the 10 mg dose, which the [facility], feels needs a PA. I will attempt PA for that dosing. [facility], feels patient is uncertain what dose he should be on.     Central Prior Authorization Team   Phone: [phone]      PA Initiation    Medication: Mounjaro-10 mg-PA initiated  Insurance Company: [employer] - Phone [phone] Fax [phone]  Pharmacy Filling the Rx: [id number] [place] [address] AT [facility] [address]  [address]  Filling Pharmacy Phone: [phone]  Filling Pharmacy Fax:    Start Date: 10/4/2022

## 2022-10-05 NOTE — TELEPHONE ENCOUNTER
PRIOR AUTHORIZATION DENIED    Medication: Mounjaro-10 mg-PA denied    Denial Date: 10/5/2022    Denial Rational: Must try/fail alternatives. I don't know if this is new insurance for him or who covered it previously.         Appeal Information:

## 2022-10-16 ENCOUNTER — HEALTH MAINTENANCE LETTER (OUTPATIENT)
Age: 57
End: 2022-10-16

## 2022-10-19 ENCOUNTER — OFFICE VISIT (OUTPATIENT)
Dept: PHARMACY | Facility: CLINIC | Age: 57
End: 2022-10-19
Payer: COMMERCIAL

## 2022-10-19 ENCOUNTER — LAB (OUTPATIENT)
Dept: LAB | Facility: CLINIC | Age: 57
End: 2022-10-19
Payer: COMMERCIAL

## 2022-10-19 VITALS
SYSTOLIC BLOOD PRESSURE: 129 MMHG | HEART RATE: 85 BPM | BODY MASS INDEX: 29.03 KG/M2 | WEIGHT: 196.6 LBS | DIASTOLIC BLOOD PRESSURE: 86 MMHG

## 2022-10-19 DIAGNOSIS — E11.9 TYPE 2 DIABETES MELLITUS WITHOUT COMPLICATION, WITHOUT LONG-TERM CURRENT USE OF INSULIN (H): Primary | ICD-10-CM

## 2022-10-19 DIAGNOSIS — F33.1 MAJOR DEPRESSIVE DISORDER, RECURRENT EPISODE, MODERATE (H): ICD-10-CM

## 2022-10-19 DIAGNOSIS — E11.9 TYPE 2 DIABETES MELLITUS WITHOUT COMPLICATION, WITHOUT LONG-TERM CURRENT USE OF INSULIN (H): ICD-10-CM

## 2022-10-19 LAB — HBA1C MFR BLD: 6 %

## 2022-10-19 PROCEDURE — 99606 MTMS BY PHARM EST 15 MIN: CPT | Performed by: PHARMACIST

## 2022-10-19 PROCEDURE — 36415 COLL VENOUS BLD VENIPUNCTURE: CPT | Performed by: PATHOLOGY

## 2022-10-19 PROCEDURE — 83036 HEMOGLOBIN GLYCOSYLATED A1C: CPT | Mod: 90 | Performed by: PATHOLOGY

## 2022-10-19 PROCEDURE — 99607 MTMS BY PHARM ADDL 15 MIN: CPT | Performed by: PHARMACIST

## 2022-10-19 PROCEDURE — 99000 SPECIMEN HANDLING OFFICE-LAB: CPT | Performed by: PATHOLOGY

## 2022-10-19 NOTE — PATIENT INSTRUCTIONS
"Recommendations from today's MTM visit:                                                       1. Decrease Lantus to 16 units today. Decrease by 3 more units if you have any lows. You may decrease lantus on your own if you notice you are in range >80% of the time.   2. Call counseling the week to schedule with someone.     Follow-up: Return in about 30 days (around 11/18/2022) for Follow up, with me, in person.    It was great speaking with you today.  I value your experience and would be very thankful for your time in providing feedback in our clinic survey. In the next few days, you may receive an email or text message from [facility] [employer] with a link to a survey related to your  clinical pharmacist.\"     To schedule another MTM appointment, please call the clinic directly or you may call the MTM scheduling line at [phone] or toll-free at [phone].     My Clinical Pharmacist's contact information:                                                      Please feel free to contact me with any questions or concerns you have.      Ayan Rahman, Pharm. D., BCACP  Medication Therapy Management Pharmacist  Direct Voicemail: [phone]     "

## 2022-10-19 NOTE — PROGRESS NOTES
Medication Therapy Management (MTM) Encounter    ASSESSMENT:                            Medication Adherence/Access: No issues identified    Type 2 Diabetes: Patient is meeting A1c goal of < 7%. Will look to titrate off of insulin.    Depression/Anxiety: Co-created a plan with Bro that he will call to schedule with a therapist this week. Encourage him that it will be good to start talk therapy.     PLAN:                            1. Decrease Lantus to 16 units today. Decrease by 3 more units if you have any lows. You may decrease lantus on your own if you notice you are in range >80% of the time.   2. Call counseling the week to schedule with someone.        Follow-up: Return in about 30 days (around 11/18/2022) for Follow up, with me, in person.    SUBJECTIVE/OBJECTIVE:                          Bro Barton is a 57 year old male called for a follow-up visit.  Today's visit is a follow-up MTM visit from 9/21/22     Reason for visit: diabetes follow-up.    Allergies/ADRs: Reviewed in chart  Past Medical History: Reviewed in chart  Tobacco: He reports that he has never smoked. He has never used smokeless tobacco.  Alcohol: Less than 1 beverage / month      Medication Adherence/Access: no issues reported    Type 2 Diabetes:  Currently taking metformin ER 2000 mg, Mounjaro 10 mg, and Lantus 20 units. No current side effects  Blood sugar monitoring: CGM: Last 7 days A: 3% IR: 97% below 0%  Last 14 days: Last 14 days A: 17% IR: 83% B: 0%  Symptoms of low blood sugar? none  Symptoms of high blood sugar? fatigue  Eye exam: up to date  Foot exam: up to date  Diet/Exercise: Reports that diet has improved. Not walking as much any more due to cold.   Aspirin: No  Statin: Yes: atorvastatin   ACEi/ARB: No.   Urine Albumin:                 Lab Results   Component Value Date     UMALCR   07/22/2022         Comment:         Unable to calculate, urine albumin or creatinine is outside the detectable limits.                Lab Results    Component Value Date     A1C 12.3 07/19/2022     A1C 7.6 01/06/2021     A1C 6.1 10/07/2020     A1C 6.4 04/29/2020     A1C 7.5 12/11/2019     A1C 8.2 09/24/2019            Depression/Anxiety: Currently taking Venlafaxine 150 mg daily. Reports this was managed by psychiatry previously, and then his old PCP. Reports no one is actively managing now. He has not called the mental health  back and is worried about having to tell his story to someone new. Reports he knows he needs to.Discussed that he plans to call them soon to schedule with them. Patient seemed to be more somber after this discussion.    PHQ 7/22/2022   PHQ-9 Total Score 12   Q9: Thoughts of better off dead/self-harm past 2 weeks Not at all         Today's Vitals: /86   Pulse 85   Wt 196 lb 9.6 oz (89.2 kg)   BMI 29.03 kg/m    ----------------      I spent 30 minutes with this patient today. All changes were made via collaborative practice agreement with Kelvin Moya MD. A copy of the visit note was provided to the patient's provider(s).    The patient was sent via [employer] a summary of these recommendations.     [facility]yan Rah[facility], Pharm. D., [facility]  Medication Therapy Management Pharmacist  Direct Voicemail: [phone]       Medication Therapy Recommendations  Type 2 diabetes mellitus without complication (H)    Current Medication: insulin glargine (LANTUS SOLOSTAR) 100 UNIT/ML pen   Rationale: Dose too high - Dosage too high - Safety   Recommendation: Decrease Dose   Status: Accepted per CPA

## 2022-10-19 NOTE — Clinical Note
A1c is now at goal!  Ayan Rahman, Pharm. D., [facility] Medication Therapy Management Pharmacist Direct Voicemail: [phone]

## 2022-11-02 ENCOUNTER — TRANSFERRED RECORDS (OUTPATIENT)
Dept: HEALTH INFORMATION MANAGEMENT | Facility: CLINIC | Age: 57
End: 2022-11-02

## 2022-11-18 ENCOUNTER — VIRTUAL VISIT (OUTPATIENT)
Dept: PHARMACY | Facility: CLINIC | Age: 57
End: 2022-11-18
Payer: COMMERCIAL

## 2022-11-18 DIAGNOSIS — F41.8 DEPRESSION WITH ANXIETY: ICD-10-CM

## 2022-11-18 DIAGNOSIS — G47.00 INSOMNIA: ICD-10-CM

## 2022-11-18 DIAGNOSIS — G47.00 INSOMNIA, UNSPECIFIED TYPE: ICD-10-CM

## 2022-11-18 DIAGNOSIS — E11.9 TYPE 2 DIABETES MELLITUS WITHOUT COMPLICATION, WITHOUT LONG-TERM CURRENT USE OF INSULIN (H): Primary | ICD-10-CM

## 2022-11-18 PROCEDURE — 99606 MTMS BY PHARM EST 15 MIN: CPT | Performed by: PHARMACIST

## 2022-11-18 PROCEDURE — 99607 MTMS BY PHARM ADDL 15 MIN: CPT | Performed by: PHARMACIST

## 2022-11-18 RX ORDER — BLOOD-GLUCOSE SENSOR
1 EACH MISCELLANEOUS
Qty: 6 EACH | Refills: 3 | Status: SHIPPED | OUTPATIENT
Start: 2022-11-18 | End: 2023-12-05

## 2022-11-18 NOTE — PATIENT INSTRUCTIONS
"Recommendations from today's MTM visit:                                                      1. Continue to decrease Lantus insulin by 2-3 units every few days that you are in your target range of blood sugar  mg/dL fasting. You may decrease lantus on your own if you notice you are in range >80% of the time.  2. Increase Mounjaro dose to 12.5 mg weekly in 3 weeks.  3. Call counseling to schedule talk therapy with someone.  4. Start using the Freestyle nicanor 3 if covered. I will send this to your pharmacy.     Follow-up: Return in about 6 weeks (around 12/30/2022) for Follow up, with me, in person.    It was great speaking with you today.  I value your experience and would be very thankful for your time in providing feedback in our clinic survey. In the next few days, you may receive an email or text message from [employer] with a link to a survey related to your  clinical pharmacist.\"     To schedule another MTM appointment, please call the clinic directly or you may call the MTM scheduling line at [phone] or toll-free at [phone].     My Clinical Pharmacist's contact information:                                                      Please feel free to contact me with any questions or concerns you have.      Leah Atwood, P4 Student Pharmacist  [facility]     "

## 2022-11-18 NOTE — PROGRESS NOTES
Medication Therapy Management (MTM) Encounter    ASSESSMENT:                            Medication Adherence/Access: No issues identified    Type 2 Diabetes: Patient is meeting goal of <7%. Patient should continue to titrate off Lantus by 2-3 units every 3 days if blood sugars are still in target range and increase Mounjaro dose to 12.5 mg weekly once he runs out of the 10 mg dose.  Freestyle karen 3 may be more convenient to use and he is interested in trying.     Depression/Anxiety: Patient would benefit from talk therapy and continued to encourage patient to call and make an appointment.     Sleep: Has surgery for his arm scheduled in January.     PLAN:                            1. Continue to decrease Lantus insulin by 2-3 units every few days that you are in your target range of blood sugar  mg/dL fasting. You may decrease lantus on your own if you notice you are in range >80% of the time.  2. Increase Mounjaro dose to 12.5 mg weekly in 3 weeks.  3. Call counseling to schedule talk therapy with someone.  4. Start using the Freestyle karen 3 if covered. I will send this to your pharmacy.     Follow-up: Return in about 6 weeks (around 12/30/2022) for Follow up, with me, in person.    SUBJECTIVE/OBJECTIVE:                          Bro Barton is a 57 year old male called for a follow-up visit.  Today's visit is a follow-up MTM visit from 10/19/2022.     Reason for visit: Diabetes follow-up.    Allergies/ADRs: None  Past Medical History: Reviewed in chart  Tobacco: He reports that he has never smoked. He has never used smokeless tobacco.  Alcohol: Did not assess during this visit.      Medication Adherence/Access: no issues reported    Type 2 Diabetes:  Currently taking metformin ER 2000 mg, Mounjaro 10 mg (plans to go up to 12.5 mg in 3 weeks), and Lantus 16 units. Maybe a little bit gassy every day since starting the Mounjaro but it isn't bothersome. Discussed switching to the Freestyle Karen 3 and patient  is interested in trying this new product.   Blood sugar monitoring: CGM: Last 7 days A: 8% IR: 92% below 0%  Last 14 days: Last 14 days A: 7% IR: 93% B: 0%  Weight 186 lb - feels great about this  Symptoms of low blood sugar? none  Symptoms of high blood sugar? fatigue  Eye exam: due for visit, will assess next follow-up visit  Foot exam: due for visit, will assess next follow-up visit  Diet/Exercise: Not assessed during this visit  Aspirin: No  Statin: Yes: atorvastatin   ACEi/ARB: No.   Urine Albumin:   Lab Results   Component Value Date    UMALCR  07/22/2022      Comment:      Unable to calculate, urine albumin or creatinine is outside the detectable limits.      Lab Results   Component Value Date    A1C 6.0 10/19/2022    A1C 12.3 07/19/2022    A1C 7.6 01/06/2021    A1C 6.1 10/07/2020    A1C 6.4 04/29/2020    A1C 7.5 12/11/2019    A1C 8.2 09/24/2019        Depression/Anxiety: Currently taking Venlafaxine 150 mg daily. Reports this was managed by psychiatry previously, and then his old PCP. Reports no one is actively managing now. He has not called the mental health  back and is worried about having to tell his story to someone new. Reports he knows he needs to. Discussed that he plans to call them soon to schedule with them. Patient seemed to be more somber after this discussion.      Sleep: Reports that sleep has been going terribly due to the pain in his arm. He reports not taking any OTC pain medications and stated that they are not helpful managing his pain. Reports that he does have surgery scheduled in January.       Today's Vitals: There were no vitals taken for this visit.  ----------------      I spent 16 minutes with this patient today. All changes were made via collaborative practice agreement with Maciej Hopkins MD. A copy of the visit note was provided to the patient's provider(s).    The patient was sent via [employer] a summary of these recommendations.     Leah Atwood, P4 Student  Pharmacist  [facility]    I agree with the above note    Ayan Rahman, Pharm. D., BCACP  Medication Therapy Management Pharmacist  Direct Voicemail: [phone]        Telemedicine Visit Details  Type of service:  Telephone visit  Start Time: 11:00 AM  End Time: 11:16 AM  Originating Location (pt. Location): Home      Distant Location (provider location):  On-site  Provider has received verbal consent for a visit from the patient? Yes     Medication Therapy Recommendations  Type 2 diabetes mellitus without complication (H)    Current Medication: Tirzepatide (MOUNJARO) 10 MG/0.5ML SOPN   Rationale: Dose too low - Dosage too low - Effectiveness   Recommendation: Increase Dose - Mounjaro 12.5 MG/0.5ML Sopn   Status: Accepted per CPA          Current Medication: insulin glargine (LANTUS SOLOSTAR) 100 UNIT/ML pen   Rationale: Dose too high - Dosage too high - Safety   Recommendation: Decrease Dose - Lantus SoloStar 100 UNIT/ML soln - taper dose by 2-3 units   Status: Accepted per CPA

## 2022-12-09 ENCOUNTER — TELEPHONE (OUTPATIENT)
Dept: PHARMACY | Facility: CLINIC | Age: 57
End: 2022-12-09

## 2022-12-09 NOTE — TELEPHONE ENCOUNTER
Patient called with the similar issue of last month where the pharmacy is unable to run the Mounjaro coupon. I spent 30 minutes on the phone with the pharmacy and they were unable to get the coupon to run. I provided them with the helpdesk number for them to call to walk them through the process.    Ayan Rahman, Pharm. D., [facility]  Medication Therapy Management Pharmacist  Direct Voicemail: [phone]

## 2022-12-13 NOTE — TELEPHONE ENCOUNTER
Called pharmacy again today and they were able to get the coupon through.    Ayan Rahman, Pharm. D., [facility]  Medication Therapy Management Pharmacist  Direct Voicemail: [phone]

## 2022-12-27 ENCOUNTER — OFFICE VISIT (OUTPATIENT)
Dept: FAMILY MEDICINE | Facility: CLINIC | Age: 57
End: 2022-12-27
Payer: COMMERCIAL

## 2022-12-27 ENCOUNTER — TELEPHONE (OUTPATIENT)
Dept: PHARMACY | Facility: CLINIC | Age: 57
End: 2022-12-27

## 2022-12-27 VITALS
HEIGHT: 69 IN | OXYGEN SATURATION: 97 % | HEART RATE: 94 BPM | SYSTOLIC BLOOD PRESSURE: 108 MMHG | RESPIRATION RATE: 20 BRPM | WEIGHT: 188.9 LBS | BODY MASS INDEX: 27.98 KG/M2 | DIASTOLIC BLOOD PRESSURE: 70 MMHG | TEMPERATURE: 97.5 F

## 2022-12-27 DIAGNOSIS — Z11.4 SCREENING FOR HIV (HUMAN IMMUNODEFICIENCY VIRUS): ICD-10-CM

## 2022-12-27 DIAGNOSIS — Z01.818 PREOP EXAM FOR INTERNAL MEDICINE: Primary | ICD-10-CM

## 2022-12-27 DIAGNOSIS — M75.101 TEAR OF RIGHT ROTATOR CUFF, UNSPECIFIED TEAR EXTENT, UNSPECIFIED WHETHER TRAUMATIC: ICD-10-CM

## 2022-12-27 DIAGNOSIS — Z11.59 NEED FOR HEPATITIS C SCREENING TEST: ICD-10-CM

## 2022-12-27 DIAGNOSIS — E11.9 TYPE 2 DIABETES MELLITUS WITHOUT COMPLICATION, WITHOUT LONG-TERM CURRENT USE OF INSULIN (H): ICD-10-CM

## 2022-12-27 PROBLEM — F32.A DEPRESSION: Status: ACTIVE | Noted: 2022-12-27

## 2022-12-27 PROBLEM — R53.83 OTHER MALAISE AND FATIGUE: Status: ACTIVE | Noted: 2022-12-27

## 2022-12-27 PROBLEM — I10 PRIMARY HYPERTENSION: Status: ACTIVE | Noted: 2022-12-27

## 2022-12-27 PROBLEM — R40.0 SOMNOLENCE, DAYTIME: Status: ACTIVE | Noted: 2017-09-22

## 2022-12-27 PROBLEM — R53.81 OTHER MALAISE AND FATIGUE: Status: ACTIVE | Noted: 2022-12-27

## 2022-12-27 PROBLEM — E78.00 PURE HYPERCHOLESTEROLEMIA: Status: ACTIVE | Noted: 2022-12-27

## 2022-12-27 PROBLEM — F43.0 ACUTE STRESS DISORDER: Status: ACTIVE | Noted: 2022-12-27

## 2022-12-27 LAB
CREAT SERPL-MCNC: 0.82 MG/DL (ref 0.67–1.17)
GFR SERPL CREATININE-BSD FRML MDRD: >90 ML/MIN/1.73M2
HBA1C MFR BLD: 5.5 % (ref 0–5.6)
HGB BLD-MCNC: 14.1 G/DL (ref 13.3–17.7)

## 2022-12-27 PROCEDURE — 82565 ASSAY OF CREATININE: CPT | Performed by: INTERNAL MEDICINE

## 2022-12-27 PROCEDURE — 86803 HEPATITIS C AB TEST: CPT | Performed by: INTERNAL MEDICINE

## 2022-12-27 PROCEDURE — 85018 HEMOGLOBIN: CPT | Performed by: INTERNAL MEDICINE

## 2022-12-27 PROCEDURE — 36415 COLL VENOUS BLD VENIPUNCTURE: CPT | Performed by: INTERNAL MEDICINE

## 2022-12-27 PROCEDURE — 87389 HIV-1 AG W/HIV-1&-2 AB AG IA: CPT | Performed by: INTERNAL MEDICINE

## 2022-12-27 PROCEDURE — 83036 HEMOGLOBIN GLYCOSYLATED A1C: CPT | Performed by: INTERNAL MEDICINE

## 2022-12-27 PROCEDURE — 99214 OFFICE O/P EST MOD 30 MIN: CPT | Performed by: INTERNAL MEDICINE

## 2022-12-27 PROCEDURE — 93000 ELECTROCARDIOGRAM COMPLETE: CPT | Performed by: INTERNAL MEDICINE

## 2022-12-27 RX ORDER — INSULIN GLARGINE 100 [IU]/ML
10 INJECTION, SOLUTION SUBCUTANEOUS AT BEDTIME
Qty: 30 ML | Refills: 1 | COMMUNITY
Start: 2022-12-27 | End: 2022-12-27

## 2022-12-27 ASSESSMENT — PATIENT HEALTH QUESTIONNAIRE - PHQ9
SUM OF ALL RESPONSES TO PHQ QUESTIONS 1-9: 10
SUM OF ALL RESPONSES TO PHQ QUESTIONS 1-9: 10
10. IF YOU CHECKED OFF ANY PROBLEMS, HOW DIFFICULT HAVE THESE PROBLEMS MADE IT FOR YOU TO DO YOUR WORK, TAKE CARE OF THINGS AT HOME, OR GET ALONG WITH OTHER PEOPLE: SOMEWHAT DIFFICULT

## 2022-12-27 ASSESSMENT — PAIN SCALES - GENERAL: PAINLEVEL: NO PAIN (0)

## 2022-12-27 NOTE — TELEPHONE ENCOUNTER
Called Bro to review Dr. Hopkins's recommendation - he will stop the Lantus.    Lab Results   Component Value Date    A1C 5.5 12/27/2022    A1C 6.0 10/19/2022    A1C 12.3 07/19/2022    A1C 7.6 01/06/2021    A1C 6.1 10/07/2020    A1C 6.4 04/29/2020    A1C 7.5 12/11/2019    A1C 8.2 09/24/2019

## 2022-12-27 NOTE — TELEPHONE ENCOUNTER
----- Message from Maciej Hopkins MD sent at 12/27/2022 11:06 AM CST -----  Regarding: Insulin  Hi,  I saw Ayan for a preop visit.  His diabetes is incredibly well controlled!  With today's a1c I think it's reasonable to stop the Lantus completely.    Thanks for all your help with him!  Maciej Hopkins MD on 12/27/2022 at 11:14 AM

## 2022-12-27 NOTE — PATIENT INSTRUCTIONS
On the MORNING of surgery:    LANTUS- LET'S STOP THIS COMPLETELY!    Mounjaro- SKIP    Metformn- SKIP    Atorvastatin- SKIP

## 2022-12-27 NOTE — PROGRESS NOTES
[address], [address]  [facility] [address]  Phone: [phone]  Primary Provider: Kelvin Moya  Pre-op Performing Provider: JEREMIAH LOYA      PREOPERATIVE EVALUATION:  Today's date: 12/27/2022    Bro Barton is a 57 year old male who presents for a preoperative evaluation.    Surgical Information:  Surgery/Procedure: Rotator cuff and bicep repair right  Surgery Location: [facility]  Surgeon: Dr.Greg Martin  Surgery Date: 1/12/23  Time of Surgery:   Where patient plans to recover: At home with family  Fax number for surgical facility: [phone]    Type of Anesthesia Anticipated: General    Assessment & Plan     The proposed surgical procedure is considered INTERMEDIATE risk.    Preop exam for internal medicine  Blood sugars much improved from previous.  Most recent echocardiogram was also reviewed.  We will check labs as ordered.  If stable can proceed as planned  - Hemoglobin A1c  - Hemoglobin  - Creatinine  - EKG 12-lead complete w/read - Clinics    Tear of right rotator cuff, unspecified tear extent, unspecified whether traumatic  As above  - Hemoglobin A1c  - Hemoglobin  - Creatinine  - EKG 12-lead complete w/read - Clinics    Screening for HIV (human immunodeficiency virus)    - HIV Antigen Antibody Combo    Need for hepatitis C screening test    - Hepatitis C Screen Reflex to HCV RNA Quant and Genotype    Type 2 diabetes mellitus without complication, without long-term current use of insulin (H)  He is gradually weaning off of insulin with the incredible help of our clinical pharmacy team.  Typically weaning down by 2 units every 2 weeks.  He will take 6 units on the day of surgery.    Plan to return to clinic in 4 months for diabetes visit.  - insulin glargine (LANTUS SOLOSTAR) 100 UNIT/ML pen  Dispense: 30 mL; Refill: 1        Risks and Recommendations:  The patient has the following additional risks and recommendations for perioperative  complications:   - No identified additional risk factors other than previously addressed    Medication Instructions:   - after reviewing his a1c we will discontinue insulin altogether.             RECOMMENDATION:  APPROVAL GIVEN to proceed with proposed procedure, without further diagnostic evaluation.      Subjective     HPI related to upcoming procedure:     Patient initially presented for preop restratification in July.  At that time his A1c was 12.3 and he had an abnormal EKG particularly with right bundle branch block and left anterior fascicular block.  Surgery was thus postponed.  He has been working with clinical pharmacy, medications for diabetes noted.  Started Mounjaro in September.  Gradually weaking lantus.  Currently at 10U.      Preop Questions 12/26/2022   1. Have you ever had a heart attack or stroke? No   2. Have you ever had surgery on your heart or blood vessels, such as a stent placement, a coronary artery bypass, or surgery on an artery in your head, neck, heart, or legs? No   3. Do you have chest pain with activity? No   4. Do you have a history of  heart failure? No   5. Do you currently have a cold, bronchitis or symptoms of other infection? No   6. Do you have a cough, shortness of breath, or wheezing? No   7. Do you or anyone in your family have previous history of blood clots? YES - YES - RUE, spontaneous, no blood thinner   8. Do you or does anyone in your family have a serious bleeding problem such as prolonged bleeding following surgeries or cuts? No   9. Have you ever had problems with anemia or been told to take iron pills? No   10. Have you had any abnormal blood loss such as black, tarry or bloody stools? No   11. Have you ever had a blood transfusion? No   12. Are you willing to have a blood transfusion if it is medically needed before, during, or after your surgery? Yes   13. Have you or any of your relatives ever had problems with anesthesia? No   14. Do you have sleep apnea,  excessive snoring or daytime drowsiness? YES - on CPAP   14a. Do you have a CPAP machine? Yes   15. Do you have any artifical heart valves or other implanted medical devices like a pacemaker, defibrillator, or continuous glucose monitor? No   16. Do you have artificial joints? No   17. Are you allergic to latex? No       Preoperative Review of :   reviewed - no record of controlled substances prescribed.          Review of Systems  Constitutional, neuro, ENT, endocrine, pulmonary, cardiac, gastrointestinal, genitourinary, musculoskeletal, integument and psychiatric systems are negative, except as otherwise noted.    Patient Active Problem List    Diagnosis Date Noted     Acute stress disorder 12/27/2022     Priority: Medium     Depression 12/27/2022     Priority: Medium     Other malaise and fatigue 12/27/2022     Priority: Medium     Primary hypertension 12/27/2022     Priority: Medium     Pure hypercholesterolemia 12/27/2022     Priority: Medium     Acute deep vein thrombosis (DVT) of right upper extremity (H) 07/19/2022     Priority: Medium     Spontaneously.  No blood thinners.  Resolved with time       Obstructive sleep apnea syndrome 09/22/2017     Priority: Medium     Dx about 2008.  Compliant with CPAP       Somnolence, daytime 09/22/2017     Priority: Medium     History of kidney stones 09/07/2016     Priority: Medium     Diabetic peripheral neuropathy (H) 09/07/2016     Priority: Medium     Erectile dysfunction due to arterial insufficiency 09/07/2016     Priority: Medium     Obesity 09/07/2016     Priority: Medium     Type 2 diabetes mellitus with diabetic mononeuropathy, with long-term current use of insulin (H) 07/06/2016     Priority: Medium     Dx around 2010. Was in a study until 2022.  During study was on metformin, januvia and lantus.  At present (7/2022 on metformin only)       Major depressive disorder, recurrent episode, moderate (H) 11/10/2009     Priority: Medium     Anxiety state  "11/10/2009     Priority: Medium      Past Medical History:   Diagnosis Date     Depressive disorder      Diabetes mellitus (H)      Past Surgical History:   Procedure Laterality Date     BIOPSY  07/01/2005    Date estimated     COLONOSCOPY  05/13/2003    I've had 3. Estimated dates 05/13/2003, 05/13/2010, 05/13/201713/     GENITOURINARY SURGERY       SOFT TISSUE SURGERY  07/01/2003    Estimated date of muscle biopsy near right biceps     Current Outpatient Medications   Medication Sig Dispense Refill     atorvastatin (LIPITOR) 40 MG tablet Take 1 tablet (40 mg) by mouth daily 90 tablet 3     Continuous Blood Gluc Sensor (FREESTYLE MY 2 SENSOR) MISC 1 each every 14 days Use 1 sensor every 14 days. Use to read blood sugars per 's instructions. 2 each 5     Continuous Blood Gluc Sensor (FREESTYLE MY 3 SENSOR) MISC 1 each every 14 days 6 each 3     insulin glargine (LANTUS SOLOSTAR) 100 UNIT/ML pen Inject 20 Units Subcutaneous At Bedtime (Patient taking differently: Inject 16 Units Subcutaneous At Bedtime) 30 mL 1     insulin pen needle (BD MARIA LUISA U/F) 32G X 4 MM miscellaneous Use once daily as directed. 100 each 3     metFORMIN (GLUCOPHAGE XR) 500 MG 24 hr tablet Take 500 mg by mouth 2 times daily (with meals)       Tirzepatide 12.5 MG/0.5ML SOPN Inject 12.5 mg Subcutaneous once a week To be started after 4 doses of the 10 mg. 2 mL 1     traZODone (DESYREL) 100 MG tablet Take 100 mg by mouth At Bedtime       venlafaxine (EFFEXOR XR) 150 MG 24 hr capsule Take 150 mg by mouth daily         No Known Allergies     Social History     Tobacco Use     Smoking status: Never     Smokeless tobacco: Never   Substance Use Topics     Alcohol use: No       History   Drug Use No         Objective     /70 (BP Location: Right arm, Patient Position: Sitting, Cuff Size: Adult Large)   Pulse 94   Temp 97.5  F (36.4  C) (Temporal)   Resp 20   Ht 1.759 m (5' 9.25\")   Wt 85.7 kg (188 lb 14.4 oz)   SpO2 97%   BMI " 27.69 kg/m     Wt Readings from Last 3 Encounters:   12/27/22 85.7 kg (188 lb 14.4 oz)   10/19/22 89.2 kg (196 lb 9.6 oz)   09/21/22 90.6 kg (199 lb 12.8 oz)         Physical Exam    GENERAL APPEARANCE: healthy, alert and no distress     EYES: EOMI,  PERRL     HENT: ear canals and TM's normal and nose and mouth without ulcers or lesions     NECK: no adenopathy, no asymmetry, masses, or scars and thyroid normal to palpation     RESP: lungs clear to auscultation - no rales, rhonchi or wheezes     CV: regular rates and rhythm, normal S1 S2, no S3 or S4 and no murmur, click or rub     ABDOMEN:  soft, nontender, no HSM or masses and bowel sounds normal     MS: extremities normal- no gross deformities noted, no evidence of inflammation in joints, FROM in all extremities.     SKIN: no suspicious lesions or rashes     NEURO: Normal strength and tone, sensory exam grossly normal, mentation intact and speech normal     PSYCH: mentation appears normal. and affect normal/bright     LYMPHATICS: No cervical adenopathy    Recent Labs   Lab Test 10/19/22  1046 07/19/22  0910 01/06/21  0000   HGB  --  15.3  --    PLT  --  272  --    NA  --  132*  --    POTASSIUM  --  4.6  --    CR  --  0.83 0.76   A1C 6.0* 12.3* 7.6*        Diagnostics:  Labs pending at this time.  Results will be reviewed when available.   EKG: Stable from prior.  no acute changes, unchanged from previous tracings    Revised Cardiac Risk Index (RCRI):  The patient has the following serious cardiovascular risks for perioperative complications:   - Diabetes Mellitus (on Insulin) = 1 point     RCRI Interpretation: 1 point: Class II (low risk - 0.9% complication rate)           Signed Electronically by: Maciej Hopkins MD  Copy of this evaluation report is provided to requesting physician.

## 2022-12-28 LAB
HCV AB SERPL QL IA: NONREACTIVE
HIV 1+2 AB+HIV1 P24 AG SERPL QL IA: NONREACTIVE

## 2022-12-30 ENCOUNTER — OFFICE VISIT (OUTPATIENT)
Dept: PHARMACY | Facility: CLINIC | Age: 57
End: 2022-12-30
Payer: COMMERCIAL

## 2022-12-30 VITALS
WEIGHT: 187 LBS | BODY MASS INDEX: 27.42 KG/M2 | SYSTOLIC BLOOD PRESSURE: 128 MMHG | HEART RATE: 96 BPM | DIASTOLIC BLOOD PRESSURE: 79 MMHG

## 2022-12-30 DIAGNOSIS — E11.9 TYPE 2 DIABETES MELLITUS WITHOUT COMPLICATION, WITHOUT LONG-TERM CURRENT USE OF INSULIN (H): ICD-10-CM

## 2022-12-30 PROCEDURE — 99607 MTMS BY PHARM ADDL 15 MIN: CPT | Performed by: PHARMACIST

## 2022-12-30 PROCEDURE — 99606 MTMS BY PHARM EST 15 MIN: CPT | Performed by: PHARMACIST

## 2022-12-30 NOTE — PATIENT INSTRUCTIONS
"Recommendations from today's MTM visit:                                                         1. Continue Mounjaro 12.5 mg weekly.  2. You may stop check blood glucose if you would like. You can continue checking if you prefer.    Happy New Year!    Follow-up: Return if symptoms worsen or fail to improve.    It was great speaking with you today.  I value your experience and would be very thankful for your time in providing feedback in our clinic survey. In the next few days, you may receive an email or text message from [employer] with a link to a survey related to your  clinical pharmacist.\"     To schedule another MTM appointment, please call the clinic directly or you may call the MTM scheduling line at [phone] or toll-free at [phone].     My Clinical Pharmacist's contact information:                                                      Please feel free to contact me with any questions or concerns you have.      Ayan Rahman, Pharm. D., BCACP  Medication Therapy Management Pharmacist  Direct Voicemail: [phone]     "

## 2022-12-30 NOTE — PROGRESS NOTES
Medication Therapy Management (MTM) Encounter    ASSESSMENT:                            Medication Adherence/Access: No issues identified    Type 2 Diabetes: Patient is meeting A1c goal of < 7%. Doing well off Lantus. Continue Mounjaro 12.5 mg and will send refills. Does not need to check blood glucose as regularly any more if he does not want to.    PLAN:                            1. Continue Mounjaro 12.5 mg weekly.  2. You may stop check blood glucose if you would like. You can continue checking if you prefer.    Follow-up: Return if symptoms worsen or fail to improve.    SUBJECTIVE/OBJECTIVE:                          Bro Barton is a 57 year old male coming in for a follow-up visit.  Today's visit is a follow-up MTM visit from 11/18/22     Reason for visit: diabetes follow-up.    Allergies/ADRs: Reviewed in chart  Past Medical History: Reviewed in chart  Tobacco: He reports that he has never smoked. He has never used smokeless tobacco.  Alcohol: not discussed today        Medication Adherence/Access: no issues reported    Type 2 Diabetes:  Currently taking metformin ER 2000 mg, and Mounjaro 12.5 mg weekly.  Surgery scheduled for the 12th at [facility].   Blood sugar monitoring: CGM: Last 7 days A: 8% IR: 92% below 0%  Last 14 days: Last 14 days A: 4% IR: 96% B: 0%  Weight 187 lb - feels great about this  Symptoms of low blood sugar? none  Symptoms of high blood sugar? fatigue  Eye exam: had one at the start of 2022  Foot exam: due for visit, will assess next follow-up visit  Diet/Exercise: B: Protein shake L: Salad and a portion of turkey or other meat  D: Salad  Aspirin: No  Statin: Yes: atorvastatin   ACEi/ARB: No.   Urine Albumin:   Lab Results   Component Value Date    UMALCR  07/22/2022      Comment:      Unable to calculate, urine albumin or creatinine is outside the detectable limits.      Lab Results   Component Value Date    A1C 5.5 12/27/2022    A1C 6.0 10/19/2022    A1C 12.3 07/19/2022    A1C 7.6 01/06/2021     A1C 6.1 10/07/2020    A1C 6.4 04/29/2020    A1C 7.5 12/11/2019    A1C 8.2 09/24/2019       Today's Vitals: /79   Pulse 96   Wt 187 lb (84.8 kg)   BMI 27.42 kg/m    ----------------    I spent 20 minutes with this patient today. All changes were made via collaborative practice agreement with Maciej Hopkins MD. A copy of the visit note was provided to the patient's provider(s).    A summary of these recommendations was sent via [employer].    Ayan Rahman, Pharm. D., BCACP  Medication Therapy Management Pharmacist  Direct Voicemail: [phone]     Medication Therapy Recommendations  No medication therapy recommendations to display

## 2023-01-31 DIAGNOSIS — E11.9 TYPE 2 DIABETES MELLITUS WITHOUT COMPLICATION, WITHOUT LONG-TERM CURRENT USE OF INSULIN (H): ICD-10-CM

## 2023-02-17 ENCOUNTER — TELEPHONE (OUTPATIENT)
Dept: PHARMACY | Facility: CLINIC | Age: 58
End: 2023-02-17
Payer: COMMERCIAL

## 2023-02-17 NOTE — TELEPHONE ENCOUNTER
Called patient's pharmacy at patients request and Mounjaro coupon now only covers partial of the medication and has a copay of $500.     Will discuss with patient on whether he would like to switch.    Ayan Rahman Pharm. D., [facility]  Medication Therapy Management Pharmacist  Direct Voicemail: [phone]

## 2023-03-22 ENCOUNTER — ALLIED HEALTH/NURSE VISIT (OUTPATIENT)
Dept: PHARMACY | Facility: CLINIC | Age: 58
End: 2023-03-22

## 2023-03-22 VITALS — BODY MASS INDEX: 28.15 KG/M2 | WEIGHT: 192 LBS | SYSTOLIC BLOOD PRESSURE: 121 MMHG | DIASTOLIC BLOOD PRESSURE: 79 MMHG

## 2023-03-22 DIAGNOSIS — Z78.9 TAKES DIETARY SUPPLEMENTS: ICD-10-CM

## 2023-03-22 DIAGNOSIS — R52 PAIN: ICD-10-CM

## 2023-03-22 DIAGNOSIS — E11.41 TYPE 2 DIABETES MELLITUS WITH DIABETIC MONONEUROPATHY, WITH LONG-TERM CURRENT USE OF INSULIN (H): Primary | ICD-10-CM

## 2023-03-22 DIAGNOSIS — Z79.4 TYPE 2 DIABETES MELLITUS WITH DIABETIC MONONEUROPATHY, WITH LONG-TERM CURRENT USE OF INSULIN (H): Primary | ICD-10-CM

## 2023-03-22 DIAGNOSIS — F33.1 MAJOR DEPRESSIVE DISORDER, RECURRENT EPISODE, MODERATE (H): ICD-10-CM

## 2023-03-22 PROCEDURE — 99605 MTMS BY PHARM NP 15 MIN: CPT | Performed by: PHARMACIST

## 2023-03-22 RX ORDER — TIRZEPATIDE 5 MG/.5ML
5 INJECTION, SOLUTION SUBCUTANEOUS
Qty: 2 ML | Refills: 0 | Status: SHIPPED | OUTPATIENT
Start: 2023-03-22 | End: 2023-06-29

## 2023-03-22 RX ORDER — TIRZEPATIDE 7.5 MG/.5ML
7.5 INJECTION, SOLUTION SUBCUTANEOUS
Qty: 2 ML | Refills: 0 | Status: SHIPPED | OUTPATIENT
Start: 2023-03-22 | End: 2023-06-29

## 2023-03-22 RX ORDER — TIRZEPATIDE 10 MG/.5ML
10 INJECTION, SOLUTION SUBCUTANEOUS
Qty: 2 ML | Refills: 0 | Status: SHIPPED | OUTPATIENT
Start: 2023-03-22 | End: 2023-06-29

## 2023-03-22 RX ORDER — TIRZEPATIDE 12.5 MG/.5ML
12.5 INJECTION, SOLUTION SUBCUTANEOUS
Qty: 2 ML | Refills: 3 | Status: SHIPPED | OUTPATIENT
Start: 2023-03-22 | End: 2023-06-29

## 2023-03-22 NOTE — PROGRESS NOTES
Medication Therapy Management (MTM) Encounter    ASSESSMENT:                            Medication Adherence/Access: No issues identified    Type 2 Diabetes:  Since he has not been on the Mounjaro for over a month he should restart at 5 mg and titrate back up to the previous dose from there.     Pain: Stable.     Depression: Stable.     Supplements: Stable.     PLAN:                            1. Restart Mounjaro at 5 mg once weekly for for weeks, increasing by 2.5 mg each month until on previous dose of 12.5 mg weekly. Each dose increase was sent to the pharmacy to be filled once previous dose completed.     Follow-up: Return if symptoms worsen or fail to improve.    SUBJECTIVE/OBJECTIVE:                          Bro Barton is a 57 year old male coming in for a follow-up visit.  Today's visit is a follow-up MTM visit from 12/30/23     Reason for visit: Mounjaro restart.    Allergies/ADRs: Reviewed in chart  Past Medical History: Reviewed in chart  Tobacco: He reports that he has never smoked. He has never used smokeless tobacco.  Alcohol: not discussed today      Medication Adherence/Access: no issues reported insurance company has changed. Reports Mounjaro is covered now.     Type 2 Diabetes:  Currently taking metformin ER 2000 mg, and hasnt been on Mounjaro 12.5 mg weekly for about.  Got surgery completed.   Blood sugar monitoring: hasnt been monitoring for about a month.     Symptoms of low blood sugar? none  Eye exam: due  Foot exam: due - seeing provider for physical next month  Diet/Exercise: Reports since stopping Mounjaro his diet has significantly worsened.  Aspirin: No  Statin: Yes: atorvastatin 40 mg  ACEi/ARB: No.   Urine Albumin:     Lab Results   Component Value Date    UMALCR  07/22/2022      Comment:      Unable to calculate, urine albumin or creatinine is outside the detectable limits.      Lab Results   Component Value Date    A1C 5.5 12/27/2022    A1C 6.0 10/19/2022    A1C 12.3 07/19/2022    A1C  7.6 01/06/2021    A1C 6.1 10/07/2020    A1C 6.4 04/29/2020    A1C 7.5 12/11/2019    A1C 8.2 09/24/2019     BP Readings from Last 3 Encounters:   03/22/23 121/79   12/30/22 128/79   12/27/22 108/70       Wt Readings from Last 2 Encounters:   03/22/23 192 lb (87.1 kg)   12/30/22 187 lb (84.8 kg)     Recent Labs   Lab Test 01/06/21  0000 12/11/19  0000   CHOL 228* 219   HDL 40 34   * NA   TRIG 283 481       Pain: Currently taking oxycodone 5 mg about two times a week at most. He takes this as needed due to recent shoulder surgery in January.     Depression: Currently taking trazodone 100 mg at night for sleep and venlafaxine  mg daily. Reports these are working well and has no questions or concerns about these medications.     Supplements: Currently taking vitamin D of an unknown dose once daily. No concerns or questions at this time.     Today's Vitals: /79   Wt 192 lb (87.1 kg)   BMI 28.15 kg/m    ----------------      I spent 15 minutes with this patient today. All changes were made via collaborative practice agreement with Maciej Hopkins MD. A copy of the visit note was provided to the patient's provider(s).    A summary of these recommendations was sent via [employer].    Wilian Norwood. D., BCACP  Medication Therapy Management Pharmacist  Direct Voicemail: [phone]       Medication Therapy Recommendations  Type 2 diabetes mellitus with diabetic mononeuropathy, with long-term current use of insulin (H)    Current Medication: metFORMIN (GLUCOPHAGE XR) 500 MG 24 hr tablet   Rationale: Synergistic therapy - Needs additional medication therapy - Indication   Recommendation: Start Medication - MOUNJARO SC   Status: Accepted per [facility]

## 2023-03-24 NOTE — PATIENT INSTRUCTIONS
"Recommendations from today's MTM visit:                                                    MTM (medication therapy management) is a service provided by a clinical pharmacist designed to help you get the most of out of your medicines.   Today we reviewed what your medicines are for, how to know if they are working, that your medicines are safe and how to make your medicine regimen as easy as possible.      1. Restart Mounjaro at 5 mg once weekly for for weeks, increasing by 2.5 mg each month until on previous dose of 12.5 mg weekly. Each dose increase was sent to the pharmacy to be filled once previous dose completed.     Follow-up: Return if symptoms worsen or fail to improve.    It was great speaking with you today.  I value your experience and would be very thankful for your time in providing feedback in our clinic survey. In the next few days, you may receive an email or text message from [employer] with a link to a survey related to your  clinical pharmacist.\"     To schedule another MTM appointment, please call the clinic directly or you may call the MTM scheduling line at [phone] or toll-free at [phone].     My Clinical Pharmacist's contact information:                                                      Please feel free to contact me with any questions or concerns you have.      Ayan Rahman, Pharm. D., [facility]  Medication Therapy Management Pharmacist  Direct Voicemail: [phone]     "

## 2023-04-11 NOTE — PROGRESS NOTES
SUBJECTIVE:   CC: Bro is an 57 year old who presents for preventative health visit.     DM:  Medications noted.  Recently started back on Mounjaro.  Plans to titrate up.      Fatigue  Ongoing.  Has sleep eval in the past and is on CPAP.  Dx 2009. Machine is about that old too.     Patient has been advised of split billing requirements and indicates understanding: Yes  Healthy Habits:     Getting at least 3 servings of Calcium per day:  NO    Bi-annual eye exam:  Yes    Dental care twice a year:  Yes    Sleep apnea or symptoms of sleep apnea:  Sleep apnea    Diet:  Regular (no restrictions)    Frequency of exercise:  None    Duration of exercise:  N/A    Taking medications regularly:  Yes    Barriers to taking medications:  None    Medication side effects:  None    PHQ-2 Total Score: 4    Additional concerns today:  Yes (fatigue and throat problem )      Today's PHQ-2 Score:       4/12/2023     9:53 AM   PHQ-2 ( 1999 [employer])   Q1: Little interest or pleasure in doing things 2   Q2: Feeling down, depressed or hopeless 2   PHQ-2 Score 4       Have you ever done Advance Care Planning? (For example, a Health Directive, POLST, or a discussion with a medical provider or your loved ones about your wishes): No, advance care planning information given to patient to review.  Patient declined advance care planning discussion at this time.    Social History     Tobacco Use     Smoking status: Never     Smokeless tobacco: Never   Vaping Use     Vaping status: Not on file   Substance Use Topics     Alcohol use: No              View : No data to display.                Last PSA: No results found for: PSA    Reviewed orders with patient. Reviewed health maintenance and updated orders accordingly - Yes  Lab work is in process    Reviewed and updated as needed this visit by clinical staff   Tobacco  Allergies  Meds              Reviewed and updated as needed this visit by Provider                     Review of  "Systems  CONSTITUTIONAL: NEGATIVE for fever, chills, change in weight  INTEGUMENTARY/SKIN: NEGATIVE for worrisome rashes, moles or lesions  EYES: NEGATIVE for vision changes or irritation  ENT: NEGATIVE for ear, mouth and throat problems  RESP: NEGATIVE for significant cough or SOB  CV: NEGATIVE for chest pain, palpitations or peripheral edema  GI: NEGATIVE for nausea, abdominal pain, heartburn, or change in bowel habits   male: negative for dysuria, hematuria, decreased urinary stream, erectile dysfunction, urethral discharge  MUSCULOSKELETAL: NEGATIVE for significant arthralgias or myalgia  NEURO: NEGATIVE for weakness, dizziness or paresthesias  PSYCHIATRIC: NEGATIVE for changes in mood or affect    OBJECTIVE:   /80 (BP Location: Right arm, Patient Position: Sitting, Cuff Size: Adult Regular)   Pulse 88   Temp 98.2  F (36.8  C) (Temporal)   Resp 18   Ht 1.753 m (5' 9\")   Wt 88.2 kg (194 lb 6.4 oz)   SpO2 97%   BMI 28.71 kg/m     Wt Readings from Last 3 Encounters:   04/12/23 88.2 kg (194 lb 6.4 oz)   03/22/23 87.1 kg (192 lb)   12/30/22 84.8 kg (187 lb)         Physical Exam  GENERAL: healthy, alert and no distress  EYES: Eyes grossly normal to inspection, PERRL and conjunctivae and sclerae normal  HENT: ear canals and TM's normal, nose and mouth without ulcers or lesions  NECK: no adenopathy, no asymmetry, masses, or scars and thyroid normal to palpation  RESP: lungs clear to auscultation - no rales, rhonchi or wheezes  CV: regular rate and rhythm, normal S1 S2, no S3 or S4, no murmur, click or rub, no peripheral edema and peripheral pulses strong  ABDOMEN: soft, nontender, no hepatosplenomegaly, no masses and bowel sounds normal  MS: no gross musculoskeletal defects noted, no edema  SKIN: no suspicious lesions or rashes  NEURO: Normal strength and tone, mentation intact and speech normal  PSYCH: mentation appears normal, affect normal/bright        ASSESSMENT/PLAN:       ICD-10-CM    1. Encounter " "for preventive care   Age and gender appropriate preventive care and screenings are discussed.  Particular attention to personal preventive care and age appropriate lifestyle including the incorporation of healthy diet and physical activity is made.     Z00.00 CBC with Platelets & Differential     Comprehensive metabolic panel     TSH with free T4 reflex     Lipid panel reflex to direct LDL Fasting     PSA, screen     CBC with Platelets & Differential     Comprehensive metabolic panel     TSH with free T4 reflex     Lipid panel reflex to direct LDL Fasting     PSA, screen      2. Diabetic peripheral neuropathy (H)   Just restarted MOunjaro.  Anticipate A1c will be a bit higher.  He is working with [facility] E11.42 Hemoglobin A1c     Hemoglobin A1c      3. Screen for colon cancer  Z12.11 Colonoscopy Screening  Referral      4. BRIDGETT (obstructive sleep apnea)   With increased fatigue and daytime sleepiness.  His current CPAP machine is over a decade old.  We will place a referral for sleep medicine G47.33 Adult Sleep Eval & Management  Referral                COUNSELING:   Reviewed preventive health counseling, as reflected in patient instructions      BMI:   Estimated body mass index is 28.71 kg/m  as calculated from the following:    Height as of this encounter: 1.753 m (5' 9\").    Weight as of this encounter: 88.2 kg (194 lb 6.4 oz).         He reports that he has never smoked. He has never used smokeless tobacco.        Maciej Hopkins MD  [facility]  "

## 2023-04-12 ENCOUNTER — OFFICE VISIT (OUTPATIENT)
Dept: FAMILY MEDICINE | Facility: CLINIC | Age: 58
End: 2023-04-12
Payer: COMMERCIAL

## 2023-04-12 VITALS
DIASTOLIC BLOOD PRESSURE: 80 MMHG | RESPIRATION RATE: 18 BRPM | BODY MASS INDEX: 28.79 KG/M2 | OXYGEN SATURATION: 97 % | TEMPERATURE: 98.2 F | WEIGHT: 194.4 LBS | SYSTOLIC BLOOD PRESSURE: 119 MMHG | HEART RATE: 88 BPM | HEIGHT: 69 IN

## 2023-04-12 DIAGNOSIS — E11.42 DIABETIC PERIPHERAL NEUROPATHY (H): ICD-10-CM

## 2023-04-12 DIAGNOSIS — G47.33 OSA (OBSTRUCTIVE SLEEP APNEA): ICD-10-CM

## 2023-04-12 DIAGNOSIS — Z12.11 SCREEN FOR COLON CANCER: ICD-10-CM

## 2023-04-12 DIAGNOSIS — Z00.00 ENCOUNTER FOR PREVENTIVE CARE: Primary | ICD-10-CM

## 2023-04-12 LAB
ALBUMIN SERPL BCG-MCNC: 4.7 G/DL (ref 3.5–5.2)
ALP SERPL-CCNC: 57 U/L (ref 40–129)
ALT SERPL W P-5'-P-CCNC: 17 U/L (ref 10–50)
ANION GAP SERPL CALCULATED.3IONS-SCNC: 14 MMOL/L (ref 7–15)
AST SERPL W P-5'-P-CCNC: 20 U/L (ref 10–50)
BASOPHILS # BLD AUTO: 0.1 10E3/UL (ref 0–0.2)
BASOPHILS NFR BLD AUTO: 1 %
BILIRUB SERPL-MCNC: 0.7 MG/DL
BUN SERPL-MCNC: 11.8 MG/DL (ref 8–23)
CALCIUM SERPL-MCNC: 9.9 MG/DL (ref 8.6–10)
CHLORIDE SERPL-SCNC: 103 MMOL/L (ref 98–107)
CHOLEST SERPL-MCNC: 145 MG/DL
CREAT SERPL-MCNC: 0.82 MG/DL (ref 0.67–1.17)
DEPRECATED HCO3 PLAS-SCNC: 25 MMOL/L (ref 22–29)
EOSINOPHIL # BLD AUTO: 0.2 10E3/UL (ref 0–0.7)
EOSINOPHIL NFR BLD AUTO: 4 %
ERYTHROCYTE [DISTWIDTH] IN BLOOD BY AUTOMATED COUNT: 13.6 % (ref 10–15)
GFR SERPL CREATININE-BSD FRML MDRD: >90 ML/MIN/1.73M2
GLUCOSE SERPL-MCNC: 128 MG/DL (ref 70–99)
HBA1C MFR BLD: 6.4 % (ref 0–5.6)
HCT VFR BLD AUTO: 43.5 % (ref 40–53)
HDLC SERPL-MCNC: 62 MG/DL
HGB BLD-MCNC: 14.6 G/DL (ref 13.3–17.7)
IMM GRANULOCYTES # BLD: 0 10E3/UL
IMM GRANULOCYTES NFR BLD: 1 %
LDLC SERPL CALC-MCNC: 69 MG/DL
LYMPHOCYTES # BLD AUTO: 1.9 10E3/UL (ref 0.8–5.3)
LYMPHOCYTES NFR BLD AUTO: 34 %
MCH RBC QN AUTO: 31 PG (ref 26.5–33)
MCHC RBC AUTO-ENTMCNC: 33.6 G/DL (ref 31.5–36.5)
MCV RBC AUTO: 92 FL (ref 78–100)
MONOCYTES # BLD AUTO: 0.5 10E3/UL (ref 0–1.3)
MONOCYTES NFR BLD AUTO: 9 %
NEUTROPHILS # BLD AUTO: 2.8 10E3/UL (ref 1.6–8.3)
NEUTROPHILS NFR BLD AUTO: 52 %
NONHDLC SERPL-MCNC: 83 MG/DL
PLATELET # BLD AUTO: 292 10E3/UL (ref 150–450)
POTASSIUM SERPL-SCNC: 4.5 MMOL/L (ref 3.4–5.3)
PROT SERPL-MCNC: 7.4 G/DL (ref 6.4–8.3)
PSA SERPL DL<=0.01 NG/ML-MCNC: 2.06 NG/ML (ref 0–3.5)
RBC # BLD AUTO: 4.71 10E6/UL (ref 4.4–5.9)
SODIUM SERPL-SCNC: 142 MMOL/L (ref 136–145)
TRIGL SERPL-MCNC: 69 MG/DL
TSH SERPL DL<=0.005 MIU/L-ACNC: 1.67 UIU/ML (ref 0.3–4.2)
WBC # BLD AUTO: 5.5 10E3/UL (ref 4–11)

## 2023-04-12 PROCEDURE — 80061 LIPID PANEL: CPT | Performed by: INTERNAL MEDICINE

## 2023-04-12 PROCEDURE — G0103 PSA SCREENING: HCPCS | Performed by: INTERNAL MEDICINE

## 2023-04-12 PROCEDURE — 36415 COLL VENOUS BLD VENIPUNCTURE: CPT | Performed by: INTERNAL MEDICINE

## 2023-04-12 PROCEDURE — 80050 GENERAL HEALTH PANEL: CPT | Performed by: INTERNAL MEDICINE

## 2023-04-12 PROCEDURE — 99396 PREV VISIT EST AGE 40-64: CPT | Performed by: INTERNAL MEDICINE

## 2023-04-12 PROCEDURE — 83036 HEMOGLOBIN GLYCOSYLATED A1C: CPT | Performed by: INTERNAL MEDICINE

## 2023-04-12 PROCEDURE — 99214 OFFICE O/P EST MOD 30 MIN: CPT | Mod: 25 | Performed by: INTERNAL MEDICINE

## 2023-04-12 ASSESSMENT — PATIENT HEALTH QUESTIONNAIRE - PHQ9: SUM OF ALL RESPONSES TO PHQ QUESTIONS 1-9: 16

## 2023-04-12 ASSESSMENT — PAIN SCALES - GENERAL: PAINLEVEL: MODERATE PAIN (5)

## 2023-04-12 NOTE — Clinical Note
Please abstract the following data from this visit with this patient into the appropriate field in Epic:  Tests that can be patient reported without a hard copy:  Eye exam with ophthalmology on this date: 1/12/2022 Exam Location: [facility] (Dr. Frazier)   Other Tests found in the patient's chart through Chart Review/Care Everywhere:  {Abstract Quality List (Optional):922665}  Note to Abstraction: If this section is blank, no results were found via Chart Review/Care Everywhere.

## 2023-04-12 NOTE — PATIENT INSTRUCTIONS
BLOOD tests    Colonoscopy    Get in with Donna for the eye exam.      We placed a referral for SLEEP MEDICINE too

## 2023-05-30 ENCOUNTER — TELEPHONE (OUTPATIENT)
Dept: GASTROENTEROLOGY | Facility: CLINIC | Age: 58
End: 2023-05-30
Payer: COMMERCIAL

## 2023-05-30 NOTE — TELEPHONE ENCOUNTER
Screening Questions  BLUE  KIND OF PREP RED  LOCATION [review exclusion criteria] GREEN  SEDATION TYPE        Y  Are you active on mychart?        JEREMIAH LOYA N  Ordering/Referring Provider?         [facility]  What type of coverage do you have?      N  Have you had a positive covid test in the last 14 days?      27.3  1. BMI  [BMI 40+ - review exclusion criteria& smart-phrase document]     SELF   2. Are you able to give consent for your medical care? [IF NO,RN REVIEW]           N   3. Are you taking any prescription pain medications on a routine schedule   (ex narcotics: oxycodone, roxicodone, oxycontin,  and percocet)? [RN Review]        N   3a. EXTENDED PREP What kind of prescription?     N  4. Do you have any chemical dependencies such as alcohol, street drugs, or methadone?        **If yes 3- 5 , please schedule with MAC sedation.**          IF YES TO ANY 6 - 10 - HOSPITAL SETTING ONLY.     N  6.   Do you need assistance transferring?     N  7.   Have you had a heart or lung transplant?    N  8.   Are you currently on dialysis?   N  9.   Do you use daily home oxygen?   N  10. Do you take nitroglycerin?   10a. N  If yes, how often?      11. Are you currently pregnant?    11a.  If yes, how many weeks? [ Greater than 12 weeks, OR NEEDED]    N 12. Do you have Pulmonary Hypertension? *NEED PAC APPT AT [facility] w/ MAC*     N  13. [review exclusion criteria]  Do you have any implantable devices in your body (pacemaker, defib, LVAD)?    N  14. In the past 6 months, have you had any heart related issues including cardiomyopathy or heart attack?     14a. N  If yes, did it require cardiac stenting if so when?     N  15. Have you had a stroke or Transient ischemic attack (TIA - aka  mini stroke ) within 6 months?      Y - BRIDGETT  16. Do you have mod to severe Obstructive Sleep Apnea?  [Hospital only]    N  17. Do you have SEVERE AND UNCONTROLLED asthma? *NEED PAC APPT AT [facility] w/MAC*     18.Do you take blood thinners?  No    N   "19. Do you take any of the following medications?    N Phentermine    N Ozempic    N Wegovy (Semaglutide)      19a. If yes, \"Hold for 7 days before procedure.  Please consult your prescribing provider if you have questions about holding this medication.\"     N   20. Do you have chronic kidney disease?      Y - DIABETIC    21. Do you have a diagnosis of diabetes?     N   22. On a regular basis do you go 3-5 days between bowel movements?      23. Preferred LOCAL Pharmacy for Pre Prescription         [id number] - [place] [id number] AT [facility] OF  [address]        - CLOSING REMINDERS -    You will receive a call from a Nurse to review instructions and health history.  This assessment must be completed prior to your procedure.  Failure to complete the Nurse assessment may result in the procedure being cancelled.      On the day of your procedure, please designatean adult(s) who can drive you home stay with you for the next 24 hours. The medicines used in the exam will make you sleepy. You will not be able to drive.      You cannot take public transportation, ride share services, or non-medical taxi service without a responsible caregiver.  Medical transport services are allowed with the requirement that a responsible caregiver will receive you at your destination.  We require that drivers and caregivers are confirmed prior to your procedure.      - SCHEDULING DETAILS -  Y  &  BRIDGETT  Hospital Setting Required & If yes, what is the exclusion?   ROBERT   Surgeon     06/15/2023  Date of Procedure  Lower Endoscopy [Colonoscopy]  Type of Procedure Scheduled  [facility]   STANDARD GOLYTE- If you answer yes to questions #8, #20, #21 [  pts ]Which Colonoscopy Prep was Sent?     MODERATE  Sedation Type      N  PAC / Pre-op Required               "

## 2023-06-06 RX ORDER — BISACODYL 5 MG/1
TABLET, DELAYED RELEASE ORAL
Qty: 4 TABLET | Refills: 0 | Status: SHIPPED | OUTPATIENT
Start: 2023-06-06 | End: 2023-11-08

## 2023-06-08 ENCOUNTER — MYC MEDICAL ADVICE (OUTPATIENT)
Dept: FAMILY MEDICINE | Facility: CLINIC | Age: 58
End: 2023-06-08
Payer: COMMERCIAL

## 2023-06-08 ENCOUNTER — MYC REFILL (OUTPATIENT)
Dept: FAMILY MEDICINE | Facility: CLINIC | Age: 58
End: 2023-06-08
Payer: COMMERCIAL

## 2023-06-08 DIAGNOSIS — E11.42 DIABETIC PERIPHERAL NEUROPATHY (H): ICD-10-CM

## 2023-06-08 DIAGNOSIS — F33.1 MAJOR DEPRESSIVE DISORDER, RECURRENT EPISODE, MODERATE (H): Primary | ICD-10-CM

## 2023-06-08 RX ORDER — TRAZODONE HYDROCHLORIDE 100 MG/1
100 TABLET ORAL AT BEDTIME
Qty: 90 TABLET | Refills: 1 | Status: SHIPPED | OUTPATIENT
Start: 2023-06-08 | End: 2023-11-27

## 2023-06-08 RX ORDER — VENLAFAXINE HYDROCHLORIDE 150 MG/1
150 CAPSULE, EXTENDED RELEASE ORAL DAILY
Qty: 90 CAPSULE | Refills: 1 | Status: SHIPPED | OUTPATIENT
Start: 2023-06-08 | End: 2023-11-27

## 2023-06-08 RX ORDER — METFORMIN HCL 500 MG
500 TABLET, EXTENDED RELEASE 24 HR ORAL 2 TIMES DAILY WITH MEALS
Qty: 180 TABLET | Refills: 1 | Status: SHIPPED | OUTPATIENT
Start: 2023-06-08 | End: 2023-12-07

## 2023-06-08 NOTE — TELEPHONE ENCOUNTER
Please see mychart refill encounter on 6/8/23.    Azalia Yadav RN  [facility]

## 2023-06-15 ENCOUNTER — HOSPITAL ENCOUNTER (OUTPATIENT)
Facility: CLINIC | Age: 58
Discharge: HOME | End: 2023-06-15
Attending: INTERNAL MEDICINE | Admitting: INTERNAL MEDICINE
Payer: COMMERCIAL

## 2023-06-15 VITALS
SYSTOLIC BLOOD PRESSURE: 133 MMHG | OXYGEN SATURATION: 97 % | DIASTOLIC BLOOD PRESSURE: 86 MMHG | HEIGHT: 69 IN | WEIGHT: 185 LBS | BODY MASS INDEX: 27.4 KG/M2 | HEART RATE: 89 BPM | RESPIRATION RATE: 23 BRPM

## 2023-06-15 DIAGNOSIS — Z12.11 ENCOUNTER FOR SCREENING COLONOSCOPY: Primary | ICD-10-CM

## 2023-06-15 LAB — COLONOSCOPY: NORMAL

## 2023-06-15 PROCEDURE — 88305 TISSUE EXAM BY PATHOLOGIST: CPT | Mod: 26 | Performed by: PATHOLOGY

## 2023-06-15 PROCEDURE — 999N000099 HC STATISTIC MODERATE SEDATION < 10 MIN: Performed by: INTERNAL MEDICINE

## 2023-06-15 PROCEDURE — 88305 TISSUE EXAM BY PATHOLOGIST: CPT | Mod: TC | Performed by: INTERNAL MEDICINE

## 2023-06-15 PROCEDURE — 45385 COLONOSCOPY W/LESION REMOVAL: CPT | Mod: PT | Performed by: INTERNAL MEDICINE

## 2023-06-15 PROCEDURE — 250N000011 HC RX IP 250 OP 636: Performed by: INTERNAL MEDICINE

## 2023-06-15 PROCEDURE — G0500 MOD SEDAT ENDO SERVICE >5YRS: HCPCS | Performed by: INTERNAL MEDICINE

## 2023-06-15 RX ORDER — FENTANYL CITRATE 50 UG/ML
INJECTION, SOLUTION INTRAMUSCULAR; INTRAVENOUS PRN
Status: DISCONTINUED | OUTPATIENT
Start: 2023-06-15 | End: 2023-06-15 | Stop reason: HOSPADM

## 2023-06-15 ASSESSMENT — ACTIVITIES OF DAILY LIVING (ADL)
ADLS_ACUITY_SCORE: 35
ADLS_ACUITY_SCORE: 35

## 2023-06-15 NOTE — H&P
[facility]  Pre-Endoscopy History and Physical     Bro Barton MRN# [id number]   YOB: 1965 Age: 58 year old     Date of Procedure: 6/15/2023  Primary care provider: Maciej Hopkins  Type of Endoscopy: colonoscopy  Reason for Procedure: Screening  Type of Anesthesia Anticipated: Moderate Sedation    HPI:    Bro is a 58 year old male who will be undergoing the above procedure.      A history and physical has been performed. The patient's medications and allergies have been reviewed. The risks and benefits of the procedure and the sedation options and risks were discussed with the patient.  All questions were answered and informed consent was obtained.      He denies a personal or family history of anesthesia complications or bleeding disorders.     Allergies   Allergen Reactions     Seasonal Allergies         Prior to Admission Medications   Prescriptions Last Dose Informant Patient Reported? Taking?   Continuous Blood Gluc Sensor (FREESTYLE MY 2 SENSOR) [facility]   No No   Si each every 14 days Use 1 sensor every 14 days. Use to read blood sugars per 's instructions.   Continuous Blood Gluc Sensor (FREESTYLE MY 3 SENSOR) [facility]   No No   Si each every 14 days   VITAMIN D, CHOLECALCIFEROL, PO   Yes No   Sig: Take by mouth daily   atorvastatin (LIPITOR) 40 MG tablet   No No   Sig: Take 1 tablet (40 mg) by mouth daily   metFORMIN (GLUCOPHAGE XR) 500 MG 24 hr tablet   No No   Sig: Take 1 tablet (500 mg) by mouth 2 times daily (with meals)   tirzepatide (MOUNJARO) 10 MG/0.5ML pen   No No   Sig: Inject 10 mg Subcutaneous every 7 days After 4 doses of the 7.5 mg   tirzepatide (MOUNJARO) 12.5 MG/0.5ML pen   No No   Sig: Inject 12.5 mg Subcutaneous every 7 days After 4 doses of the 10 mg dose   tirzepatide (MOUNJARO) 5 MG/0.5ML pen   No No   Sig: Inject 5 mg Subcutaneous every 7 days   tirzepatide (MOUNJARO) 7.5 MG/0.5ML pen   No No   Sig: Inject 7.5 mg  "Subcutaneous every 7 days After 4 doses of the 5 mg   traZODone (DESYREL) 100 MG tablet   No No   Sig: Take 1 tablet (100 mg) by mouth At Bedtime   venlafaxine (EFFEXOR XR) 150 MG 24 hr capsule   No No   Sig: Take 1 capsule (150 mg) by mouth daily      Facility-Administered Medications: None       Patient Active Problem List   Diagnosis     Type 2 diabetes mellitus with diabetic mononeuropathy, with long-term current use of insulin (H)     History of kidney stones     Major depressive disorder, recurrent episode, moderate (H)     Obstructive sleep apnea syndrome     Acute deep vein thrombosis (DVT) of right upper extremity (H)     Acute stress disorder     Depression     Anxiety state     Diabetic peripheral neuropathy (H)     Erectile dysfunction due to arterial insufficiency     Obesity     Other malaise and fatigue     Primary hypertension     Pure hypercholesterolemia     Somnolence, daytime        Past Medical History:   Diagnosis Date     Depressive disorder      Diabetes mellitus (H)         Past Surgical History:   Procedure Laterality Date     BIOPSY  07/01/2005    Date estimated     COLONOSCOPY  05/13/2003    I've had 3. Estimated dates 05/13/2003, 05/13/2010, 05/13/201713/     GENITOURINARY SURGERY       SOFT TISSUE SURGERY  07/01/2003    Estimated date of muscle biopsy near right biceps       Social History     Tobacco Use     Smoking status: Never     Smokeless tobacco: Never   Vaping Use     Vaping status: Not on file   Substance Use Topics     Alcohol use: No       Family History   Problem Relation Age of Onset     Unknown/Adopted No family hx of        REVIEW OF SYSTEMS:     5 point ROS negative except as noted above in HPI, including Gen., Resp., CV, GI &  system review.      PHYSICAL EXAM:   There were no vitals taken for this visit. Estimated body mass index is 28.71 kg/m  as calculated from the following:    Height as of 4/12/23: 1.753 m (5' 9\").    Weight as of 4/12/23: 88.2 kg (194 lb 6.4 " oz).   GENERAL APPEARANCE: healthy, alert and no distress  MENTAL STATUS: alert  AIRWAY EXAM: Mallampatti Class I (visualization of the soft palate, fauces, uvula, anterior and posterior pillars)  RESP: lungs clear to auscultation - no rales, rhonchi or wheezes  CV: normal S1 S2, no S3 or S4      DIAGNOSTICS:    Not indicated      IMPRESSION   ASA Class 2 - Mild systemic disease        PLAN:       Plan for colonoscopy. We discussed the risks, benefits and alternatives and the patient wished to proceed.    The above has been forwarded to the consulting provider.      Signed Electronically by: Haim Graham MD,MD  Sveta 15, 2023      [facility]  Ph: [phone] Fax: [phone]

## 2023-06-16 ENCOUNTER — TRANSFERRED RECORDS (OUTPATIENT)
Dept: MULTI SPECIALTY CLINIC | Facility: CLINIC | Age: 58
End: 2023-06-16
Payer: COMMERCIAL

## 2023-06-16 LAB
PATH REPORT.COMMENTS IMP SPEC: NORMAL
PATH REPORT.COMMENTS IMP SPEC: NORMAL
PATH REPORT.FINAL DX SPEC: NORMAL
PATH REPORT.GROSS SPEC: NORMAL
PATH REPORT.MICROSCOPIC SPEC OTHER STN: NORMAL
PATH REPORT.RELEVANT HX SPEC: NORMAL
PHOTO IMAGE: NORMAL
RETINOPATHY: NEGATIVE
RETINOPATHY: NORMAL

## 2023-06-29 DIAGNOSIS — E11.41 TYPE 2 DIABETES MELLITUS WITH DIABETIC MONONEUROPATHY, WITH LONG-TERM CURRENT USE OF INSULIN (H): ICD-10-CM

## 2023-06-29 DIAGNOSIS — Z79.4 TYPE 2 DIABETES MELLITUS WITH DIABETIC MONONEUROPATHY, WITH LONG-TERM CURRENT USE OF INSULIN (H): ICD-10-CM

## 2023-06-29 RX ORDER — TIRZEPATIDE 12.5 MG/.5ML
12.5 INJECTION, SOLUTION SUBCUTANEOUS
Qty: 6 ML | Refills: 3 | Status: SHIPPED | OUTPATIENT
Start: 2023-06-29 | End: 2023-07-10

## 2023-06-30 ENCOUNTER — TELEPHONE (OUTPATIENT)
Dept: FAMILY MEDICINE | Facility: CLINIC | Age: 58
End: 2023-06-30

## 2023-06-30 NOTE — TELEPHONE ENCOUNTER
Please abstract the following data from this visit with this patient into the appropriate field in Epic:    Tests that can be patient reported without a hard copy:     Eye exam with ophthalmology on this date: 06/16/2023 Exam Location: [facility].     Other Tests found in the patient's chart through Chart Review/Care Everywhere:      Note to Abstraction: If this section is blank, no results were found via Chart Review/Care Everywhere.      Nora ZALDIVAR MA on 6/30/2023 at 11:54 AM

## 2023-07-10 ENCOUNTER — MYC MEDICAL ADVICE (OUTPATIENT)
Dept: FAMILY MEDICINE | Facility: CLINIC | Age: 58
End: 2023-07-10
Payer: COMMERCIAL

## 2023-07-10 DIAGNOSIS — K13.70 ORAL LESION: Primary | ICD-10-CM

## 2023-07-10 DIAGNOSIS — E11.41 TYPE 2 DIABETES MELLITUS WITH DIABETIC MONONEUROPATHY, WITH LONG-TERM CURRENT USE OF INSULIN (H): ICD-10-CM

## 2023-07-10 DIAGNOSIS — Z79.4 TYPE 2 DIABETES MELLITUS WITH DIABETIC MONONEUROPATHY, WITH LONG-TERM CURRENT USE OF INSULIN (H): ICD-10-CM

## 2023-07-10 RX ORDER — TIRZEPATIDE 12.5 MG/.5ML
12.5 INJECTION, SOLUTION SUBCUTANEOUS
Qty: 6 ML | Refills: 3 | Status: SHIPPED | OUTPATIENT
Start: 2023-07-10 | End: 2023-11-03

## 2023-07-10 NOTE — TELEPHONE ENCOUNTER
TO PCP    Pt requesting ENT referral    Pended for your approval    Kayla MILLAN RN  yobany Tasha RN Triage Team

## 2023-07-28 NOTE — TELEPHONE ENCOUNTER
FUTURE VISIT INFORMATION      FUTURE VISIT INFORMATION:  Date: 10/24/23  Time: 10:55am  Location: [facility]  REFERRAL INFORMATION:  Referring provider:  Maciej Hopkins MD   Referring providers clinic:  [facility]  Reason for visit/diagnosis  per patient oral lesion possible cyst. confirmed [facility]     RECORDS REQUESTED FROM:       Clinic name Comments Records Status Imaging Status   [facility]  7/10/23, 4/12/23 - Mychart referral with Maciej Hopkins MD  Epic

## 2023-07-31 ENCOUNTER — OFFICE VISIT (OUTPATIENT)
Dept: PHARMACY | Facility: CLINIC | Age: 58
End: 2023-07-31

## 2023-07-31 ENCOUNTER — TELEPHONE (OUTPATIENT)
Dept: FAMILY MEDICINE | Facility: CLINIC | Age: 58
End: 2023-07-31

## 2023-07-31 VITALS
DIASTOLIC BLOOD PRESSURE: 83 MMHG | BODY MASS INDEX: 30.39 KG/M2 | SYSTOLIC BLOOD PRESSURE: 148 MMHG | WEIGHT: 205.8 LBS | HEART RATE: 85 BPM

## 2023-07-31 DIAGNOSIS — E11.9 TYPE 2 DIABETES MELLITUS WITHOUT COMPLICATION, WITHOUT LONG-TERM CURRENT USE OF INSULIN (H): ICD-10-CM

## 2023-07-31 PROCEDURE — 99606 MTMS BY PHARM EST 15 MIN: CPT | Performed by: PHARMACIST

## 2023-07-31 NOTE — TELEPHONE ENCOUNTER
Central Prior Authorization Team   Phone: [phone]        PRIOR AUTHORIZATION DENIED    Medication: FREESTYLE MY 2 SENSOR [facility]  Insurance Company: [employer] [facility] - Phone [phone] Fax [phone]  Denial Date: 7/31/2023  Denial Rational:           Appeal Information:         Patient Notified: No  Please note: Providers/Clinics are to notify the patients of the denial outcomes and steps going forward.

## 2023-07-31 NOTE — PATIENT INSTRUCTIONS
"Recommendations from today's MTM visit:                                                    MTM (medication therapy management) is a service provided by a clinical pharmacist designed to help you get the most of out of your medicines.   Today we reviewed what your medicines are for, how to know if they are working, that your medicines are safe and how to make your medicine regimen as easy as possible.      Call [facility] to make sure they get your Mounjaro on auto refill  Sent refill of Karen 2 sensors  Due for microalbumin lab    Follow-up: Return in about 3 months (around 11/3/2023) for Follow up, with me, in person.    It was great speaking with you today.  I value your experience and would be very thankful for your time in providing feedback in our clinic survey. In the next few days, you may receive an email or text message from [employer] with a link to a survey related to your  clinical pharmacist.\"     To schedule another MTM appointment, please call the clinic directly or you may call the MTM scheduling line at [phone] or toll-free at [phone].     My Clinical Pharmacist's contact information:                                                      Please feel free to contact me with any questions or concerns you have.      Ayan Rahman, Pharm. D., [facility]  Medication Therapy Management Pharmacist     "

## 2023-07-31 NOTE — PROGRESS NOTES
Medication Therapy Management (MTM) Encounter    ASSESSMENT:                            Medication Adherence/Access: No issues identified    Type 2 Diabetes: Patient is meeting A1c goal of < 7%.      PLAN:                            Call [facility] to make sure they get your Mounjaro on auto refill  Sent refill of Karen 2 sensors  Due for microalbumin lab    Follow-up: Return in about 3 months (around 11/3/2023) for Follow up, with me, in person.    SUBJECTIVE/OBJECTIVE:                          Bro Barton is a 58 year old male coming in for a follow-up visit from 3/22/23.       Reason for visit: Mounjaro follow-up.    Allergies/ADRs: Reviewed in chart  Past Medical History: Reviewed in chart  Tobacco: He reports that he has never smoked. He has never used smokeless tobacco.  Alcohol: not discussed today      Medication Adherence/Access: no issues reported    Type 2 Diabetes:  Currently taking metformin ER 2000 mg, and hasnt been on Mounjaro 12.5 mg weekly. Had run out of Mounjaro for a month.    Blood sugar monitoring: hasnt been monitoring for about a month.      Symptoms of low blood sugar? none  Eye exam: due  Foot exam: due - seeing provider for physical next month  Diet/Exercise: Reports he has had a lack of exercise and bad eating habits. Has put on some weight and this had put him in a mental spiral.   Aspirin: No  Statin: Yes: atorvastatin 40 mg  ACEi/ARB: No.   Urine Albumin:   Lab Results   Component Value Date    UMALCR  07/22/2022      Comment:      Unable to calculate, urine albumin or creatinine is outside the detectable limits.      Lab Results   Component Value Date    A1C 6.4 04/12/2023    A1C 5.5 12/27/2022    A1C 6.0 10/19/2022    A1C 12.3 07/19/2022    A1C 7.6 01/06/2021    A1C 6.1 10/07/2020    A1C 6.4 04/29/2020    A1C 7.5 12/11/2019    A1C 8.2 09/24/2019         Today's Vitals: BP (!) 148/83   Pulse 85   Wt 205 lb 12.8 oz (93.4 kg)   BMI 30.39 kg/m    ----------------      I spent 15 minutes  with this patient today. All changes were made via collaborative practice agreement with Maciej Hopkins MD. A copy of the visit note was provided to the patient's provider(s).    A summary of these recommendations was sent via [employer].    Ayan Rahman Pharm. D., [facility]  Medication Therapy Management Pharmacist     Medication Therapy Recommendations  No medication therapy recommendations to display

## 2023-10-17 ENCOUNTER — MYC MEDICAL ADVICE (OUTPATIENT)
Dept: FAMILY MEDICINE | Facility: CLINIC | Age: 58
End: 2023-10-17
Payer: COMMERCIAL

## 2023-10-18 ENCOUNTER — NURSE TRIAGE (OUTPATIENT)
Dept: FAMILY MEDICINE | Facility: CLINIC | Age: 58
End: 2023-10-18
Payer: COMMERCIAL

## 2023-10-18 DIAGNOSIS — U07.1 INFECTION DUE TO 2019 NOVEL CORONAVIRUS: Primary | ICD-10-CM

## 2023-10-18 NOTE — TELEPHONE ENCOUNTER
Additional Information    Negative: SEVERE difficulty breathing (e.g., struggling for each breath, speaks in single words)    Negative: Difficult to awaken or acting confused (e.g., disoriented, slurred speech)    Negative: Bluish (or gray) lips or face now    Negative: Shock suspected (e.g., cold/pale/clammy skin, too weak to stand, low BP, rapid pulse)    Negative: Sounds like a life-threatening emergency to the triager    Negative: SEVERE or constant chest pain or pressure  (Exception: Mild central chest pain, present only when coughing.)    Negative: MODERATE difficulty breathing (e.g., speaks in phrases, SOB even at rest, pulse 100-120)    Negative: Headache and stiff neck (can't touch chin to chest)    Negative: Chest pain or pressure  (Exception: MILD central chest pain, present only when coughing.)    Negative: Drinking very little and dehydration suspected (e.g., no urine > 12 hours, very dry mouth, very lightheaded)    Negative: Patient sounds very sick or weak to the triager    Negative: Fever > 103 F (39.4 C)    Protocols used: Coronavirus (COVID-19) Diagnosed or [id number]

## 2023-10-18 NOTE — CONFIDENTIAL NOTE
RN COVID TREATMENT VISIT  10/18/23    The patient has been triaged and does not require a higher level of care.    Bro Barton  58 year old  Current weight? 205 lb    Has the patient been seen by a primary care provider at an [facility] or [facility] within the past two years? Yes.   Have you been in close proximity to/do you have a known exposure to a person with a confirmed case of influenza? No.     General treatment eligibility:  Date of positive COVID test (PCR or at home)?  10/17/23    Are you or have you been hospitalized for this COVID-19 infection? No.   Have you received monoclonal antibodies or antiviral treatment for COVID-19 since this positive test? No.   Do you have any of the following conditions that place you at risk of being very sick from COVID-19?   - Age 50 years or older  Yes, patient has at least one high risk condition as noted above.     Current COVID symptoms:   - cough  - fatigue  - muscle or body aches  - congestion or runny nose  Yes. Patient has at least one symptom as selected.     How many days since symptoms started? 5 days or less. Established patient, 12 years or older weighing at least 88.2 lbs, who has symptoms that started in the past 5 days, has not been hospitalized nor received treatment already, and is at risk for being very sick from COVID-19.     Treatment eligibility by RN:  Are you currently pregnant or nursing? No  Do you have a clinically significant hypersensitivity to nirmatrelvir or ritonavir, or toxic epidermal necrolysis (TEN) or De-Yon Syndrome? No  Do you have a history of hepatitis, any hepatic impairment on the Problem List (such as Child-Desir Class C, cirrhosis, fatty liver disease, alcoholic liver disease), or was the last liver lab (hepatic panel, ALT, AST, ALK Phos, bilirubin) elevated in the past 6 months? No  Do you have any history of severe renal impairment (eGFR < 30mL/min)? No    Is patient eligible to  continue? Yes, patient meets all eligibility requirements for the RN COVID treatment (as denoted by all no responses above).     Current Outpatient Medications   Medication Sig Dispense Refill    atorvastatin (LIPITOR) 40 MG tablet Take 1 tablet (40 mg) by mouth daily 90 tablet 3    bisacodyl (DULCOLAX) 5 MG EC tablet Take 2 tablets at 3 pm the day before your procedure. If your procedure is before 11 am, take 2 additional tablets at 11 pm. If your procedure is after 11 am, take 2 additional tablets at 6 am. For additional instructions refer to your colonoscopy prep instructions. 4 tablet 0    Continuous Blood Gluc Sensor (FREESTYLE MY 2 SENSOR) MISC 1 each every 14 days Use 1 sensor every 14 days. Use to read blood sugars per 's instructions. 2 each 11    Continuous Blood Gluc Sensor (FREESTYLE MY 3 SENSOR) MISC 1 each every 14 days 6 each 3    metFORMIN (GLUCOPHAGE XR) 500 MG 24 hr tablet Take 1 tablet (500 mg) by mouth 2 times daily (with meals) 180 tablet 1    polyethylene glycol (GOLYTELY) 236 g suspension The night before the exam at 6 pm drink an 8-ounce glass every 15 minutes until the jug is half empty. If you arrive before 11 AM: Drink the other half of the Golytely jug at 11 PM night before procedure. If you arrive after 11 AM: Drink the other half of the Golytely jug at 6 AM day of procedure. For additional instructions refer to your colonoscopy prep instructions. 4000 mL 0    tirzepatide (MOUNJARO) 12.5 MG/0.5ML pen Inject 12.5 mg Subcutaneous every 7 days 6 mL 3    traZODone (DESYREL) 100 MG tablet Take 1 tablet (100 mg) by mouth At Bedtime 90 tablet 1    venlafaxine (EFFEXOR XR) 150 MG 24 hr capsule Take 1 capsule (150 mg) by mouth daily 90 capsule 1    VITAMIN D, CHOLECALCIFEROL, PO Take by mouth daily         Medications from List 1 of the standing order (on medications that exclude the use of Paxlovid) that patient is taking: NONE. Is patient taking Emir's Wort? No  Is patient  taking Emir's Wort or any meds from List 1? No.   Medications from List 2 of the standing order (on meds that provider needs to adjust) that patient is taking: NONE. Is patient on any of the meds from List 2? No.   Medications from List 3 of standing order (on meds that a RN needs to adjust) that patient is taking: trazodone (Desyrel): Instructed patient to stop taking trazodone while taking Paxlovid and restart trazodone 3 days after the completion of Paxlovid.  Is patient on any meds from List 3? Yes. Patient is on meds from list 3. No meds require a provider visit and at least one med required RN to adjust.     Paxlovid has an approximate 90% reduction in hospitalization. Paxlovid can possibly cause altered sense of taste, diarrhea (loose, watery stools), high blood pressure, muscle aches.     Would patient like a Paxlovid prescription?   Yes.   Lab Results   Component Value Date    GFRESTIMATED >90 04/12/2023       Was last eGFR reduced? No, eGFR 60 or greater/ No Result on record. Patient can receive the normal renal function dose. Paxlovid Rx sent to [place] [facility]   [facility]    Temporary change to home medications: See above    All medication adjustments (holds, etc) were discussed with the patient and patient was asked to repeat back (teachback) their med adjustment.  Did patient understand med adjustment? Yes, patient repeated back and understood correctly.        Reviewed the following instructions with the patient:    Paxlovid (nimatrelvir and ritonavir)    How it works  Two medicines (nirmatrelvir and ritonavir) are taken together. They stop the virus from growing. Less amount of virus is easier for your body to fight.    How to take  Medicine comes in a daily container with both medicine tablets. Take by mouth twice daily (once in the morning, once at night) for 5 days.  The number of tablets to take varies by patient.  Don't chew or break capsules. Swallow whole.    When to take  Take as soon  as possible after positive COVID-19 test result, and within 5 days of your first symptoms.    Possible side effects  Can cause altered sense of taste, diarrhea (loose, watery stools), high blood pressure, muscle aches.    Edith Rock RN

## 2023-10-18 NOTE — TELEPHONE ENCOUNTER
Please triage positive  COVID-19 pt and route to RN hub for follow up.    Hello.  I tested positive for COVID today.  I would like to speak with someone regarding next steps.     Directions on the Website told me to leave a phone number on this message at which to contact me.  Here are two:     Cell - [phone]  [facility] [phone]     Thank you.     Bro Barton

## 2023-10-18 NOTE — TELEPHONE ENCOUNTER
Routing to covid hub to please address and call patient directly - thank you!     Azalia Yadav RN  [facility]

## 2023-10-24 ENCOUNTER — PRE VISIT (OUTPATIENT)
Dept: OTOLARYNGOLOGY | Facility: CLINIC | Age: 58
End: 2023-10-24

## 2023-11-03 ENCOUNTER — LAB (OUTPATIENT)
Dept: LAB | Facility: CLINIC | Age: 58
End: 2023-11-03
Payer: COMMERCIAL

## 2023-11-03 ENCOUNTER — OFFICE VISIT (OUTPATIENT)
Dept: PHARMACY | Facility: CLINIC | Age: 58
End: 2023-11-03

## 2023-11-03 VITALS — SYSTOLIC BLOOD PRESSURE: 111 MMHG | BODY MASS INDEX: 26.29 KG/M2 | DIASTOLIC BLOOD PRESSURE: 73 MMHG | WEIGHT: 178 LBS

## 2023-11-03 DIAGNOSIS — E11.9 TYPE 2 DIABETES MELLITUS WITHOUT COMPLICATION, WITHOUT LONG-TERM CURRENT USE OF INSULIN (H): ICD-10-CM

## 2023-11-03 DIAGNOSIS — E11.9 TYPE 2 DIABETES MELLITUS WITHOUT COMPLICATION, WITHOUT LONG-TERM CURRENT USE OF INSULIN (H): Primary | ICD-10-CM

## 2023-11-03 LAB
ANION GAP SERPL CALCULATED.3IONS-SCNC: 9 MMOL/L (ref 7–15)
BUN SERPL-MCNC: 9 MG/DL (ref 8–23)
CALCIUM SERPL-MCNC: 9.7 MG/DL (ref 8.8–10.2)
CHLORIDE SERPL-SCNC: 103 MMOL/L (ref 98–107)
CREAT SERPL-MCNC: 0.8 MG/DL (ref 0.67–1.17)
CREAT UR-MCNC: 164 MG/DL
DEPRECATED HCO3 PLAS-SCNC: 29 MMOL/L (ref 22–29)
EGFRCR SERPLBLD CKD-EPI 2021: >90 ML/MIN/1.73M2
GLUCOSE SERPL-MCNC: 95 MG/DL (ref 70–99)
HBA1C MFR BLD: 5 %
MICROALBUMIN UR-MCNC: <12 MG/L
MICROALBUMIN/CREAT UR: NORMAL MG/G{CREAT}
POTASSIUM SERPL-SCNC: 4.6 MMOL/L (ref 3.4–5.3)
SODIUM SERPL-SCNC: 141 MMOL/L (ref 135–145)

## 2023-11-03 PROCEDURE — 99606 MTMS BY PHARM EST 15 MIN: CPT | Performed by: PHARMACIST

## 2023-11-03 PROCEDURE — 83036 HEMOGLOBIN GLYCOSYLATED A1C: CPT | Performed by: INTERNAL MEDICINE

## 2023-11-03 PROCEDURE — 99000 SPECIMEN HANDLING OFFICE-LAB: CPT | Performed by: PATHOLOGY

## 2023-11-03 PROCEDURE — 36415 COLL VENOUS BLD VENIPUNCTURE: CPT | Performed by: PATHOLOGY

## 2023-11-03 PROCEDURE — 80048 BASIC METABOLIC PNL TOTAL CA: CPT | Performed by: PATHOLOGY

## 2023-11-03 PROCEDURE — 82570 ASSAY OF URINE CREATININE: CPT | Performed by: INTERNAL MEDICINE

## 2023-11-03 RX ORDER — TIRZEPATIDE 10 MG/.5ML
10 INJECTION, SOLUTION SUBCUTANEOUS
Qty: 6 ML | Refills: 3 | Status: SHIPPED | OUTPATIENT
Start: 2023-11-03 | End: 2023-12-11

## 2023-11-03 NOTE — PROGRESS NOTES
"Medication Therapy Management (MTM) Encounter     ASSESSMENT:                             Medication Adherence/Access: No issues identified     Type 2 Diabetes: Patient is due for an A1c check - should be in range of < 7% with the patient losing weight on the mounjaro. He could also benefit from decreasing his dose of mounjaro from 12.5 mg weekly to 10 mg weekly.     Elevated Blood Pressure: When the patient was off of the mounjaro for a month, his weight went up and so his blood pressure was increased. Since being back on the mounjaro and losing weight, his blood pressure was within goal of less than 130/80 mmHg - no medications need to be started at this time         PLAN:                             Decrease dose of Mounjaro from 12.5 mg weekly to 10 mg once weekly  Due for RSV, influenzae, pneumonia, and Shingles vaccines.      Follow-up: 2/2/24     SUBJECTIVE/OBJECTIVE:                          Bro Barton is a 58 year old male coming in for a follow-up visit from 07/31/2023.                  Reason for visit: Diabetes and hypertension Follow Up .     Allergies/ADRs: Reviewed in chart  Past Medical History: Reviewed in chart  Tobacco: He reports that he has never smoked. He has never used smokeless tobacco.  Alcohol: none      Medication Adherence/Access: no issues reported     Diabetes   -Metformin  mg two times a day   -Mounjaro 12.5 mg injection every 7 days      Patient reports some stomach \"goofiness\" but no diarrhea or nausea. The GI issues he reports are not bothersome.     Blood sugar monitoring: not needed  Current diabetes symptoms: none  Diet/Exercise: not discussed at this visit      Eye exam is up to date  Foot exam: due  Urine Albumin:   Lab Results   Component Value Date    ALCR  11/03/2023      Comment:      Unable to calculate, urine albumin and/or urine creatinine is outside detectable limits.  Microalbuminuria is defined as an albumin:creatinine ratio of 17 to 299 for males and 25 to " 299 for females. A ratio of albumin:creatinine of 300 or higher is indicative of overt proteinuria.  Due to biologic variability, positive results should be confirmed by a second, first-morning random or 24-hour timed urine specimen. If there is discrepancy, a third specimen is recommended. When 2 out of 3 results are in the microalbuminuria range, this is evidence for incipient nephropathy and warrants increased efforts at glucose control, blood pressure control, and institution of therapy with an angiotensin-converting-enzyme (ACE) inhibitor (if the patient can tolerate it).        Lab Results   Component Value Date    A1C 5.0 11/03/2023    A1C 6.4 04/12/2023    A1C 5.5 12/27/2022    A1C 6.0 10/19/2022    A1C 12.3 07/19/2022    A1C 7.6 01/06/2021    A1C 6.1 10/07/2020    A1C 6.4 04/29/2020    A1C 7.5 12/11/2019    A1C 8.2 09/24/2019              Elevated Blood Pressure:  Not taking anything for blood pressure - has not been consistently elevated.      Patient does not self-monitor blood pressure.       BP Readings from Last 3 Encounters:   11/03/23 111/73   07/31/23 (!) 148/83   06/15/23 133/86       Pulse Readings from Last 3 Encounters:   07/31/23 85   06/15/23 89   04/12/23 88            ----------------      MED REC REQUIRED  Post Medication Reconciliation Status:  Patient was not discharged from an inpatient facility or TCU    I spent 15 minutes with this patient today. All changes were made via collaborative practice agreement with Maciej Hopkins MD. A copy of the visit note was provided to the patient's provider(s).     A summary of these recommendations was sent via [employer].     Vinicius Dc, WilianD Student, on 11/3/2023 at 12:03 PM    Preceptor Cosignature: I have reviewed and verified the student's documentation.    Ayan Rahman, Pharm. D., BCACP  Medication Therapy Management Pharmacist  Direct Voicemail: [phone]         Today's Vitals: /73   Wt 178 lb (80.7 kg)   BMI 26.29 kg/m     ----------------         Medication Therapy Recommendations  Type 2 diabetes mellitus with diabetic mononeuropathy, with long-term current use of insulin (H)    Current Medication: tirzepatide (MOUNJARO) 12.5 MG/0.5ML pen (Discontinued)   Rationale: Dose too high - Dosage too high - Safety   Recommendation: Decrease Dose - Mounjaro 10 MG/0.5ML Sopn   Status: Accepted per CPA

## 2023-11-08 NOTE — PATIENT INSTRUCTIONS
"Recommendations from today's MTM visit:                                                    MTM (medication therapy management) is a service provided by a clinical pharmacist designed to help you get the most of out of your medicines.   Today we reviewed what your medicines are for, how to know if they are working, that your medicines are safe and how to make your medicine regimen as easy as possible.    Decrease dose of Mounjaro from 12.5 mg weekly to 10 mg once weekly  Due for RSV, influenzae, pneumonia, and Shingles vaccines.     Follow-up: Return in about 13 weeks (around 2/2/2024) for Follow up, with me, in person.    It was great speaking with you today.  I value your experience and would be very thankful for your time in providing feedback in our clinic survey. In the next few days, you may receive an email or text message from [employer] with a link to a survey related to your  clinical pharmacist.\"     To schedule another MTM appointment, please call the clinic directly or you may call the MTM scheduling line at [phone] or toll-free at [phone].     My Clinical Pharmacist's contact information:                                                      Please feel free to contact me with any questions or concerns you have.      Ayan Rahman, Pharm. D., [facility]  Medication Therapy Management Pharmacist    "

## 2023-11-15 ENCOUNTER — TRANSFERRED RECORDS (OUTPATIENT)
Dept: HEALTH INFORMATION MANAGEMENT | Facility: CLINIC | Age: 58
End: 2023-11-15
Payer: COMMERCIAL

## 2023-11-26 DIAGNOSIS — F33.1 MAJOR DEPRESSIVE DISORDER, RECURRENT EPISODE, MODERATE (H): ICD-10-CM

## 2023-11-27 RX ORDER — TRAZODONE HYDROCHLORIDE 100 MG/1
100 TABLET ORAL AT BEDTIME
Qty: 90 TABLET | Refills: 1 | Status: SHIPPED | OUTPATIENT
Start: 2023-11-27 | End: 2023-12-05

## 2023-11-27 RX ORDER — VENLAFAXINE HYDROCHLORIDE 150 MG/1
150 CAPSULE, EXTENDED RELEASE ORAL DAILY
Qty: 90 CAPSULE | Refills: 1 | Status: SHIPPED | OUTPATIENT
Start: 2023-11-27 | End: 2023-12-05

## 2023-12-04 ASSESSMENT — PATIENT HEALTH QUESTIONNAIRE - PHQ9
SUM OF ALL RESPONSES TO PHQ QUESTIONS 1-9: 8
SUM OF ALL RESPONSES TO PHQ QUESTIONS 1-9: 8
10. IF YOU CHECKED OFF ANY PROBLEMS, HOW DIFFICULT HAVE THESE PROBLEMS MADE IT FOR YOU TO DO YOUR WORK, TAKE CARE OF THINGS AT HOME, OR GET ALONG WITH OTHER PEOPLE: SOMEWHAT DIFFICULT

## 2023-12-05 ENCOUNTER — OFFICE VISIT (OUTPATIENT)
Dept: FAMILY MEDICINE | Facility: CLINIC | Age: 58
End: 2023-12-05
Payer: COMMERCIAL

## 2023-12-05 VITALS
BODY MASS INDEX: 25.33 KG/M2 | WEIGHT: 171 LBS | OXYGEN SATURATION: 100 % | HEART RATE: 97 BPM | DIASTOLIC BLOOD PRESSURE: 76 MMHG | SYSTOLIC BLOOD PRESSURE: 137 MMHG | RESPIRATION RATE: 18 BRPM | HEIGHT: 69 IN

## 2023-12-05 DIAGNOSIS — L94.0 MORPHEA: ICD-10-CM

## 2023-12-05 DIAGNOSIS — E11.9 TYPE 2 DIABETES MELLITUS WITHOUT COMPLICATION, WITHOUT LONG-TERM CURRENT USE OF INSULIN (H): Primary | ICD-10-CM

## 2023-12-05 DIAGNOSIS — Z23 NEED FOR PROPHYLACTIC VACCINATION AND INOCULATION AGAINST INFLUENZA: ICD-10-CM

## 2023-12-05 DIAGNOSIS — F33.1 MAJOR DEPRESSIVE DISORDER, RECURRENT EPISODE, MODERATE (H): ICD-10-CM

## 2023-12-05 DIAGNOSIS — Z23 NEED FOR VACCINATION: ICD-10-CM

## 2023-12-05 PROCEDURE — 90471 IMMUNIZATION ADMIN: CPT | Performed by: INTERNAL MEDICINE

## 2023-12-05 PROCEDURE — 90686 IIV4 VACC NO PRSV 0.5 ML IM: CPT | Performed by: INTERNAL MEDICINE

## 2023-12-05 PROCEDURE — 99214 OFFICE O/P EST MOD 30 MIN: CPT | Mod: 25 | Performed by: INTERNAL MEDICINE

## 2023-12-05 PROCEDURE — 90677 PCV20 VACCINE IM: CPT | Performed by: INTERNAL MEDICINE

## 2023-12-05 PROCEDURE — 90472 IMMUNIZATION ADMIN EACH ADD: CPT | Performed by: INTERNAL MEDICINE

## 2023-12-05 RX ORDER — VENLAFAXINE HYDROCHLORIDE 75 MG/1
75 CAPSULE, EXTENDED RELEASE ORAL DAILY
Qty: 90 CAPSULE | Refills: 1 | Status: SHIPPED | OUTPATIENT
Start: 2023-12-05 | End: 2023-12-11

## 2023-12-05 RX ORDER — TRAZODONE HYDROCHLORIDE 50 MG/1
50 TABLET, FILM COATED ORAL AT BEDTIME
Qty: 90 TABLET | Refills: 1 | Status: SHIPPED | OUTPATIENT
Start: 2023-12-05 | End: 2023-12-11

## 2023-12-05 RX ORDER — SOLRIAMFETOL 150 MG/1
TABLET, FILM COATED ORAL
COMMUNITY
Start: 2023-11-25

## 2023-12-05 RX ORDER — TRIAMCINOLONE ACETONIDE 1 MG/ML
LOTION TOPICAL
COMMUNITY
Start: 2019-10-30 | End: 2023-12-11

## 2023-12-05 ASSESSMENT — PAIN SCALES - GENERAL: PAINLEVEL: NO PAIN (0)

## 2023-12-05 NOTE — PATIENT INSTRUCTIONS
You got a Flu and Pneumonia vaccine today    Get an RSV vaccine after 2 weeks at your pharmacy    Get a Covid Vaccine at your pharmacy in 2 months or so.    Get shingles vaccine at your pharmacy (no rush)      VENLAFAXINE:  Reduce to 75mg a day    TRAZODONE:  Reduce to 50mg a night.      We placed a referral to dermatology.

## 2023-12-06 DIAGNOSIS — E11.42 DIABETIC PERIPHERAL NEUROPATHY (H): ICD-10-CM

## 2023-12-07 RX ORDER — METFORMIN HCL 500 MG
500 TABLET, EXTENDED RELEASE 24 HR ORAL 2 TIMES DAILY WITH MEALS
Qty: 180 TABLET | Refills: 1 | Status: SHIPPED | OUTPATIENT
Start: 2023-12-07 | End: 2024-03-27

## 2023-12-11 ENCOUNTER — MYC REFILL (OUTPATIENT)
Dept: PHARMACY | Facility: CLINIC | Age: 58
End: 2023-12-11
Payer: COMMERCIAL

## 2023-12-11 DIAGNOSIS — F33.1 MAJOR DEPRESSIVE DISORDER, RECURRENT EPISODE, MODERATE (H): ICD-10-CM

## 2023-12-11 DIAGNOSIS — E11.9 TYPE 2 DIABETES MELLITUS WITHOUT COMPLICATION, WITHOUT LONG-TERM CURRENT USE OF INSULIN (H): ICD-10-CM

## 2023-12-11 DIAGNOSIS — R21 RASH AND NONSPECIFIC SKIN ERUPTION: Primary | ICD-10-CM

## 2023-12-12 RX ORDER — TRIAMCINOLONE ACETONIDE 1 MG/ML
LOTION TOPICAL 2 TIMES DAILY
Qty: 60 ML | Refills: 1 | Status: SHIPPED | OUTPATIENT
Start: 2023-12-12 | End: 2024-06-10

## 2023-12-12 RX ORDER — VENLAFAXINE HYDROCHLORIDE 75 MG/1
75 CAPSULE, EXTENDED RELEASE ORAL DAILY
Qty: 90 CAPSULE | Refills: 1 | Status: SHIPPED | OUTPATIENT
Start: 2023-12-12 | End: 2024-05-06

## 2023-12-12 RX ORDER — TRAZODONE HYDROCHLORIDE 50 MG/1
50 TABLET, FILM COATED ORAL AT BEDTIME
Qty: 90 TABLET | Refills: 1 | Status: SHIPPED | OUTPATIENT
Start: 2023-12-12 | End: 2024-04-23

## 2023-12-12 RX ORDER — TIRZEPATIDE 10 MG/.5ML
10 INJECTION, SOLUTION SUBCUTANEOUS
Qty: 6 ML | Refills: 1 | Status: SHIPPED | OUTPATIENT
Start: 2023-12-12 | End: 2024-03-27

## 2023-12-12 NOTE — TELEPHONE ENCOUNTER
Prescription approved per [facility] Refill Protocol.  Marlin Serrano, RN  [facility] Triage Nurse

## 2023-12-12 NOTE — TELEPHONE ENCOUNTER
Prescription approved per [facility] Refill Protocol.  Marlin Serrano, RN  [facility] Triage Nurse

## 2023-12-19 ENCOUNTER — TELEPHONE (OUTPATIENT)
Dept: OTOLARYNGOLOGY | Facility: CLINIC | Age: 58
End: 2023-12-19

## 2023-12-19 ENCOUNTER — OFFICE VISIT (OUTPATIENT)
Dept: OTOLARYNGOLOGY | Facility: CLINIC | Age: 58
End: 2023-12-19
Attending: INTERNAL MEDICINE
Payer: COMMERCIAL

## 2023-12-19 VITALS
HEART RATE: 100 BPM | SYSTOLIC BLOOD PRESSURE: 146 MMHG | BODY MASS INDEX: 24.85 KG/M2 | WEIGHT: 173.6 LBS | DIASTOLIC BLOOD PRESSURE: 84 MMHG | HEIGHT: 70 IN | OXYGEN SATURATION: 100 %

## 2023-12-19 DIAGNOSIS — K13.70 ORAL LESION: ICD-10-CM

## 2023-12-19 PROCEDURE — 99204 OFFICE O/P NEW MOD 45 MIN: CPT | Performed by: OTOLARYNGOLOGY

## 2023-12-19 ASSESSMENT — PAIN SCALES - GENERAL: PAINLEVEL: NO PAIN (0)

## 2023-12-19 NOTE — LETTER
2023       RE: Bro Barton  [address]  [place] [address]     Dear Colleague,    Thank you for referring your patient, Bro Barton, to the [facility] [place] at [facility]. Please see a copy of my visit note below.      Otolaryngology Clinic      Name: Bro Barton  MRN: [id number]  Age: 58 year old  : 1965  Referring provider: Maciej Hopkins  2023      Chief Complaint:  Consultation    History of Present Illness:   Bro Barton is a 58 year old male who presents for consultation regarding oral lesion or possible cyst.      Of note, he denies smoking and has never used smokeless tobacco.      Active Medications:     Current Outpatient Medications:      metFORMIN (GLUCOPHAGE XR) 500 MG 24 hr tablet, TAKE 1 TABLET(500 MG) BY MOUTH TWICE DAILY WITH MEALS, Disp: 180 tablet, Rfl: 1     SOLRIAMFETOL 150 MG TABS tablet, , Disp: , Rfl:      tirzepatide (MOUNJARO) 10 MG/0.5ML pen, Inject 10 mg Subcutaneous every 7 days, Disp: 6 mL, Rfl: 1     traZODone (DESYREL) 50 MG tablet, Take 1 tablet (50 mg) by mouth at bedtime, Disp: 90 tablet, Rfl: 1     triamcinolone (KENALOG) 0.1 % external lotion, Apply topically 2 times daily, Disp: 60 mL, Rfl: 1     venlafaxine (EFFEXOR XR) 75 MG 24 hr capsule, Take 1 capsule (75 mg) by mouth daily, Disp: 90 capsule, Rfl: 1     VITAMIN D, CHOLECALCIFEROL, PO, Take by mouth daily, Disp: , Rfl:       Allergies:   Seasonal allergies      Past Medical History:  Past Medical History:   Diagnosis Date     Depressive disorder      Diabetes mellitus (H)      Patient Active Problem List   Diagnosis     Type 2 diabetes mellitus with diabetic mononeuropathy, with long-term current use of insulin (H)     History of kidney stones     Major depressive disorder, recurrent episode, moderate (H)     Obstructive sleep apnea syndrome     Acute deep vein thrombosis (DVT) of right upper  "extremity (H)     Acute stress disorder     Depression     Anxiety state     Diabetic peripheral neuropathy (H)     Erectile dysfunction due to arterial insufficiency     Obesity     Other malaise and fatigue     Primary hypertension     Pure hypercholesterolemia     Somnolence, daytime        Past Surgical History:  Past Surgical History:   Procedure Laterality Date     BIOPSY  07/01/2005    Date estimated     COLONOSCOPY  05/13/2003    I've had 3. Estimated dates 05/13/2003, 05/13/2010, 05/13/201713/     COLONOSCOPY N/A 6/15/2023    Procedure: Colonoscopy;  Surgeon: Haim Graham MD;  Location:  GI     GENITOURINARY SURGERY       KIDNEY STONE SURGERY       ROTATOR CUFF REPAIR RT/LT Right      SOFT TISSUE SURGERY  07/01/2003    Estimated date of muscle biopsy near right biceps       Family History:   Family History   Problem Relation Age of Onset     Unknown/Adopted No family hx of          Social History:   Social History     Tobacco Use     Smoking status: Never     Smokeless tobacco: Never   Substance Use Topics     Alcohol use: No     Drug use: No       Review of Systems:   Pertinent items are noted in HPI or as in patient entered ROS below, remainder of complete ROS is negative.       12/12/2023     3:26 PM   [facility] ROS   Psychology Frequently feeling anxious   Musculoskeletal Sore or stiff joints    Neck pain         Physical Exam:   BP (!) 146/84 (BP Location: Right arm, Patient Position: Sitting, Cuff Size: Adult Regular)   Pulse 100   Ht 1.778 m (5' 10\")   Wt 78.7 kg (173 lb 9.6 oz)   SpO2 100%   BMI 24.91 kg/m       Constitutional:  The patient was unaccompanied, well-groomed, and in no acute distress.    Skin:  Warm and pink.    Neurologic:  Alert and oriented x 3.  CN's III-XII within normal limits.  Voice normal.   Psychiatric:  The patient's affect was calm, cooperative, and appropriate.    Respiratory:  Breathing comfortably without stridor or exertion of accessory muscles.    Eyes: " Extraocular movement intact.    Head:  Normocephalic and atraumatic.  No lesions or scars.    Ears:  Pinnae and tragus non-tender.  EAC's and TM's were clear.   Nose:  Sinuses were non-tender.  Anterior rhinoscopy revealed midline septum and absence of purulence or polyps.    OC/OP: Mobile 1 cm rubbering mass that is almost on a stock inside his left buccal mucosa.   Neck:  Supple with normal laryngeal and tracheal landmarks.  The parotid beds were without masses.  No palpable thyroid.  Lymphatic:  There is no palpable lymphadenopathy in the neck.     Assessment and Plan:  Bro Barton is a 58 year old male who presents for consultation regarding oral lesion or possible cyst. Fibroma by clincal dianosis. Told to remove in clinic but patient has denied having us remove the lesion. I will follow up with him via telephone in about 4 months.     Follow-up: Return in about 4 months (around 4/19/2024) for using a phone visit.         Scribe Disclosure:   I, Andrew Fry, am serving as a scribe; to document services personally performed by Tobias Govea MD -based on data collection and the provider's statements to me.     Provider Disclosure:  I agree with above History, Review of Systems, Physical exam and Plan.  I have reviewed the content of the documentation and have edited it as needed. I have personally performed the services documented here and the documentation accurately represents those services and the decisions I have made.      Electronically signed by:  Tobias Govea MD        Again, thank you for allowing me to participate in the care of your patient.      Sincerely,    Tobias Govea MD

## 2023-12-19 NOTE — NURSING NOTE
"Chief Complaint   Patient presents with    Consult   Blood pressure (!) 146/84, pulse 100, height 1.778 m (5' 10\"), weight 78.7 kg (173 lb 9.6 oz), SpO2 100%. Darien Jacobson, EMT    "

## 2023-12-19 NOTE — PROGRESS NOTES
Otolaryngology Clinic      Name: Bro Barton  MRN: [id number]  Age: 58 year old  : 1965  Referring provider: Maciej Hopkins  2023      Chief Complaint:  Consultation    History of Present Illness:   Bor Barton is a 58 year old male who presents for consultation regarding oral lesion or possible cyst.      Of note, he denies smoking and has never used smokeless tobacco.      Active Medications:     Current Outpatient Medications:     metFORMIN (GLUCOPHAGE XR) 500 MG 24 hr tablet, TAKE 1 TABLET(500 MG) BY MOUTH TWICE DAILY WITH MEALS, Disp: 180 tablet, Rfl: 1    SOLRIAMFETOL 150 MG TABS tablet, , Disp: , Rfl:     tirzepatide (MOUNJARO) 10 MG/0.5ML pen, Inject 10 mg Subcutaneous every 7 days, Disp: 6 mL, Rfl: 1    traZODone (DESYREL) 50 MG tablet, Take 1 tablet (50 mg) by mouth at bedtime, Disp: 90 tablet, Rfl: 1    triamcinolone (KENALOG) 0.1 % external lotion, Apply topically 2 times daily, Disp: 60 mL, Rfl: 1    venlafaxine (EFFEXOR XR) 75 MG 24 hr capsule, Take 1 capsule (75 mg) by mouth daily, Disp: 90 capsule, Rfl: 1    VITAMIN D, CHOLECALCIFEROL, PO, Take by mouth daily, Disp: , Rfl:       Allergies:   Seasonal allergies      Past Medical History:  Past Medical History:   Diagnosis Date    Depressive disorder     Diabetes mellitus (H)      Patient Active Problem List   Diagnosis    Type 2 diabetes mellitus with diabetic mononeuropathy, with long-term current use of insulin (H)    History of kidney stones    Major depressive disorder, recurrent episode, moderate (H)    Obstructive sleep apnea syndrome    Acute deep vein thrombosis (DVT) of right upper extremity (H)    Acute stress disorder    Depression    Anxiety state    Diabetic peripheral neuropathy (H)    Erectile dysfunction due to arterial insufficiency    Obesity    Other malaise and fatigue    Primary hypertension    Pure hypercholesterolemia    Somnolence, daytime        Past Surgical History:  Past Surgical History:   Procedure  "Laterality Date    BIOPSY  07/01/2005    Date estimated    COLONOSCOPY  05/13/2003    I've had 3. Estimated dates 05/13/2003, 05/13/2010, 05/13/201713/    COLONOSCOPY N/A 6/15/2023    Procedure: Colonoscopy;  Surgeon: Haim Graham MD;  Location:  GI    GENITOURINARY SURGERY      KIDNEY STONE SURGERY      ROTATOR CUFF REPAIR RT/LT Right     SOFT TISSUE SURGERY  07/01/2003    Estimated date of muscle biopsy near right biceps       Family History:   Family History   Problem Relation Age of Onset    Unknown/Adopted No family hx of          Social History:   Social History     Tobacco Use    Smoking status: Never    Smokeless tobacco: Never   Substance Use Topics    Alcohol use: No    Drug use: No       Review of Systems:   Pertinent items are noted in HPI or as in patient entered ROS below, remainder of complete ROS is negative.       12/12/2023     3:26 PM   [facility] ROS   Psychology Frequently feeling anxious   Musculoskeletal Sore or stiff joints    Neck pain         Physical Exam:   BP (!) 146/84 (BP Location: Right arm, Patient Position: Sitting, Cuff Size: Adult Regular)   Pulse 100   Ht 1.778 m (5' 10\")   Wt 78.7 kg (173 lb 9.6 oz)   SpO2 100%   BMI 24.91 kg/m       Constitutional:  The patient was unaccompanied, well-groomed, and in no acute distress.    Skin:  Warm and pink.    Neurologic:  Alert and oriented x 3.  CN's III-XII within normal limits.  Voice normal.   Psychiatric:  The patient's affect was calm, cooperative, and appropriate.    Respiratory:  Breathing comfortably without stridor or exertion of accessory muscles.    Eyes: Extraocular movement intact.    Head:  Normocephalic and atraumatic.  No lesions or scars.    Ears:  Pinnae and tragus non-tender.  EAC's and TM's were clear.   Nose:  Sinuses were non-tender.  Anterior rhinoscopy revealed midline septum and absence of purulence or polyps.    OC/OP: Mobile 1 cm rubbering mass that is almost on a stock inside his left buccal mucosa. "   Neck:  Supple with normal laryngeal and tracheal landmarks.  The parotid beds were without masses.  No palpable thyroid.  Lymphatic:  There is no palpable lymphadenopathy in the neck.     Assessment and Plan:  Bro Barton is a 58 year old male who presents for consultation regarding oral lesion or possible cyst. Fibroma by clincal dianosis. Told to remove in clinic but patient has denied having us remove the lesion. I will follow up with him via telephone in about 4 months.     Follow-up: Return in about 4 months (around 4/19/2024) for using a phone visit.         Scribe Disclosure:   I, Andrew Fry, am serving as a scribe; to document services personally performed by Tobias Govea MD -based on data collection and the provider's statements to me.     Provider Disclosure:  I agree with above History, Review of Systems, Physical exam and Plan.  I have reviewed the content of the documentation and have edited it as needed. I have personally performed the services documented here and the documentation accurately represents those services and the decisions I have made.      Electronically signed by:  Tobias Govea MD

## 2024-01-16 ENCOUNTER — OFFICE VISIT (OUTPATIENT)
Dept: DERMATOLOGY | Facility: CLINIC | Age: 59
End: 2024-01-16
Payer: COMMERCIAL

## 2024-01-16 ENCOUNTER — PRE VISIT (OUTPATIENT)
Dept: DERMATOLOGY | Facility: CLINIC | Age: 59
End: 2024-01-16

## 2024-01-16 DIAGNOSIS — L30.4 INTERTRIGO: Primary | ICD-10-CM

## 2024-01-16 DIAGNOSIS — R21 RASH: ICD-10-CM

## 2024-01-16 DIAGNOSIS — L94.0 MORPHEA: ICD-10-CM

## 2024-01-16 PROCEDURE — 88313 SPECIAL STAINS GROUP 2: CPT | Mod: TC | Performed by: STUDENT IN AN ORGANIZED HEALTH CARE EDUCATION/TRAINING PROGRAM

## 2024-01-16 PROCEDURE — 11104 PUNCH BX SKIN SINGLE LESION: CPT | Mod: GC | Performed by: STUDENT IN AN ORGANIZED HEALTH CARE EDUCATION/TRAINING PROGRAM

## 2024-01-16 PROCEDURE — 88305 TISSUE EXAM BY PATHOLOGIST: CPT | Mod: 26 | Performed by: DERMATOLOGY

## 2024-01-16 PROCEDURE — 88313 SPECIAL STAINS GROUP 2: CPT | Mod: 26 | Performed by: DERMATOLOGY

## 2024-01-16 PROCEDURE — 99204 OFFICE O/P NEW MOD 45 MIN: CPT | Mod: 25 | Performed by: STUDENT IN AN ORGANIZED HEALTH CARE EDUCATION/TRAINING PROGRAM

## 2024-01-16 RX ORDER — DESONIDE 0.5 MG/G
CREAM TOPICAL 2 TIMES DAILY
Qty: 60 G | Refills: 3 | Status: SHIPPED | OUTPATIENT
Start: 2024-01-16

## 2024-01-16 NOTE — LETTER
1/16/2024       RE: Bro Barton  [address]  [place] [address]     Dear Colleague,    Thank you for referring your patient, Bro Barton, to the [facility] DERMATOLOGY [facility] [place] at [facility]. Please see a copy of my visit note below.    Dermatology Rooming Note    Bro Barton's goals for this visit include:   Chief Complaint   Patient presents with    Autoimmune Disease     Recent diagnosis Morphea with rash on back of his neck     Patricio Ordaz, EMT-B      I have personally examined this patient and agree with the resident doctor's documentation and plan of care. I have reviewed and amended the resident's note. The documentation accurately reflects my clinical observations, diagnoses, treatment and follow-up plans. I was present for key portions of the procedure(s).       Juan Cain MD  Dermatology Staff      [facility] Dermatology Note    Encounter Date: Jan 16, 2024    Dermatology Problem List:  -Morphea (Diagnosed at outside Dermatology clinic) with Biopsy    Major PMHx  - Diabetes Mellitus  ______________________________________    Impression/Plan:  Bro was seen today for autoimmune disease.  Diagnoses and all orders for this visit:    #Morphea vs Scleredema   History of Morphea diagnosed at outside Dermatology clinic confirmed on biopsy. However, exam not clinically consistent with Morphea given poorly demarcated involvement on back/neck. Appearance and history of longstanding diabetes (10+ years) more consistent with Scleredema and it's possible that biopsy incorrectly diagnosed as Morphea given similar histologic appearance of fibrosis in these entities. As such will further evaluate with punch biopsy today to determine treatment course.  Of note, discussed with patient that overlying erythema possibly exacerbated by chronic topical steroid use.  - Punch Biopsy (~6 mm) and pathology   - Adult Dermatology  "[facility] Referral          #Skin Check: Seborrheic Keratosis, Cruz Angiomas   Skin exam appears to be benign (Seborrheic Keratosis, Cherry Angioma) with no concerning malignant findings. Patient does not wish to pursue further treatment at this time.    #Intertrigo    Chronic history of intertrigo with low concern for tinea cruris given lack of satellite lesion or leading scale. Educated patient and discussed conservative management such as spandex to reduce friction and daily showers among others.  - Desonide 0.05% external cream (applied twice daily)    Procedures: Punch Biopsy       Follow-up as needed .       Staff Involved:  Staff and Resident    Jordan Santiago MD  Internal Medicine and Pediatrics, PGY1    Juan Cain MD   of Dermatology  Department of Dermatology  [facility]    CC:   Chief Complaint   Patient presents with    Autoimmune Disease     Recent diagnosis Morphea with rash on back of his neck       History of Present Illness:  Mr. Bro Barton is a 58 year old male with past medical history of diabetes (~10 years) who presents as a new patient.    Mr. Barton reports that he was diagnosed with Morphea in ~2019 and was previously seen by a Dermatologist 2-3 years ago (confirmed on biopsy at the time). He was treated with Kenalog cream which he is unsure if it helped and states that the area of the skin has remained thick for close to 30 years. Mr. Barton states that the rash is not painful, itchy, and he is unsure if its improving/worsening as nobody who has seen his neck has mentioned anything to him. The area dose not particularly bother him and he is not interested in more aggressive or involved therapies at this time.    Of note, patient mentions that his non-biologic father passed away from melanoma in his 70s and would like a skin check if possible. Lastly, he mentions that he has had \"jock itch\" for over a decade which he had managed " with an antifungal cream, but never seems to go away. Showers daily.    Labs:  Lab Results   Component Value Date    A1C 5.0 11/03/2023    A1C 6.4 04/12/2023    A1C 5.5 12/27/2022    A1C 6.0 10/19/2022    A1C 12.3 07/19/2022    A1C 7.6 01/06/2021    A1C 6.1 10/07/2020    A1C 6.4 04/29/2020    A1C 7.5 12/11/2019    A1C 8.2 09/24/2019     Physical exam:  Vitals: There were no vitals taken for this visit.  GEN: well developed, well-nourished, in no acute distress, in a pleasant mood.     SKIN: Bird phototype I  - Total skin excluding the undergarment areas was performed. The exam included the head/face, neck, both arms, chest, back, abdomen, both legs, digits and/or nails. Excludes Feet per patient preference: Sporadic cherry angiomas present especially on chest and abdomen, also sporadic waxy/stuck on seborrheic keratosis.   - Neck: Diffuse red skin with associated fibrosis along posterior neck with poorly demarcated margins. No overlying scale. Blanchable.  - Groin/Thigh: Well circumscribed erythematous rash with central clearing and no satellite lesions or leading edge   - No other lesions of concern on areas examined.     Past Medical History:   Past Medical History:   Diagnosis Date    Depressive disorder     Diabetes mellitus (H)      Past Surgical History:   Procedure Laterality Date    BIOPSY  07/01/2005    Date estimated    COLONOSCOPY  05/13/2003    I've had 3. Estimated dates 05/13/2003, 05/13/2010, 05/13/201713/    COLONOSCOPY N/A 6/15/2023    Procedure: Colonoscopy;  Surgeon: Haim Graham MD;  Location:  GI    GENITOURINARY SURGERY      KIDNEY STONE SURGERY      ROTATOR CUFF REPAIR RT/LT Right     SOFT TISSUE SURGERY  07/01/2003    Estimated date of muscle biopsy near right biceps       Social History:   reports that he has never smoked. He has never used smokeless tobacco. He reports that he does not drink alcohol and does not use drugs.    Family History:  Family History   Problem  Relation Age of Onset    Unknown/Adopted No family hx of        Medications:  Current Outpatient Medications   Medication Sig Dispense Refill    metFORMIN (GLUCOPHAGE XR) 500 MG 24 hr tablet TAKE 1 TABLET(500 MG) BY MOUTH TWICE DAILY WITH MEALS 180 tablet 1    SOLRIAMFETOL 150 MG TABS tablet       tirzepatide (MOUNJARO) 10 MG/0.5ML pen Inject 10 mg Subcutaneous every 7 days 6 mL 1    traZODone (DESYREL) 50 MG tablet Take 1 tablet (50 mg) by mouth at bedtime 90 tablet 1    triamcinolone (KENALOG) 0.1 % external lotion Apply topically 2 times daily 60 mL 1    venlafaxine (EFFEXOR XR) 75 MG 24 hr capsule Take 1 capsule (75 mg) by mouth daily 90 capsule 1    VITAMIN D, CHOLECALCIFEROL, PO Take by mouth daily       Allergies   Allergen Reactions    Seasonal Allergies

## 2024-01-16 NOTE — NURSING NOTE
Lidocaine-epinephrine 1-1:692841 % injection   1.5mL once for one use, starting 1/16/2024 ending 1/16/2024,  2mL disp, R-0, injection  Injected by Dr. Cain

## 2024-01-16 NOTE — PROGRESS NOTES
Dermatology Rooming Note    Bro Barton's goals for this visit include:   Chief Complaint   Patient presents with    Autoimmune Disease     Recent diagnosis Morphea with rash on back of his neck     Patricio Ordaz, EMT-B

## 2024-01-16 NOTE — PROGRESS NOTES
I have personally examined this patient and agree with the resident doctor's documentation and plan of care. I have reviewed and amended the resident's note. The documentation accurately reflects my clinical observations, diagnoses, treatment and follow-up plans. I was present for key portions of the procedure(s).       Juan Cain MD  Dermatology Staff      [facility] Dermatology Note    Encounter Date: Jan 16, 2024    Dermatology Problem List:  -Morphea (Diagnosed at outside Dermatology clinic) with Biopsy    Major PMHx  - Diabetes Mellitus  ______________________________________    Impression/Plan:  Bro was seen today for autoimmune disease.  Diagnoses and all orders for this visit:    #Morphea vs Scleredema   History of Morphea diagnosed at outside Dermatology clinic confirmed on biopsy. However, exam not clinically consistent with Morphea given poorly demarcated involvement on back/neck. Appearance and history of longstanding diabetes (10+ years) more consistent with Scleredema and it's possible that biopsy incorrectly diagnosed as Morphea given similar histologic appearance of fibrosis in these entities. As such will further evaluate with punch biopsy today to determine treatment course.  Of note, discussed with patient that overlying erythema possibly exacerbated by chronic topical steroid use.  - Punch Biopsy (~6 mm) and pathology   - Adult Dermatology  Referral          #Skin Check: Seborrheic Keratosis, Cruz Angiomas   Skin exam appears to be benign (Seborrheic Keratosis, Cherry Angioma) with no concerning malignant findings. Patient does not wish to pursue further treatment at this time.    #Intertrigo    Chronic history of intertrigo with low concern for tinea cruris given lack of satellite lesion or leading scale. Educated patient and discussed conservative management such as spandex to reduce friction and daily showers among others.  - Desonide 0.05% external cream  "(applied twice daily)    Procedures: Punch Biopsy       Follow-up as needed .       Staff Involved:  Staff and Resident    Jordan Santiago MD  Internal Medicine and Pediatrics, PGY1    Juan Cain MD   of Dermatology  Department of Dermatology  [facility]    CC:   Chief Complaint   Patient presents with    Autoimmune Disease     Recent diagnosis Morphea with rash on back of his neck       History of Present Illness:  Mr. Bro Barton is a 58 year old male with past medical history of diabetes (~10 years) who presents as a new patient.    Mr. Barton reports that he was diagnosed with Morphea in ~2019 and was previously seen by a Dermatologist 2-3 years ago (confirmed on biopsy at the time). He was treated with Kenalog cream which he is unsure if it helped and states that the area of the skin has remained thick for close to 30 years. Mr. Barton states that the rash is not painful, itchy, and he is unsure if its improving/worsening as nobody who has seen his neck has mentioned anything to him. The area dose not particularly bother him and he is not interested in more aggressive or involved therapies at this time.    Of note, patient mentions that his non-biologic father passed away from melanoma in his 70s and would like a skin check if possible. Lastly, he mentions that he has had \"jock itch\" for over a decade which he had managed with an antifungal cream, but never seems to go away. Showers daily.    Labs:  Lab Results   Component Value Date    A1C 5.0 11/03/2023    A1C 6.4 04/12/2023    A1C 5.5 12/27/2022    A1C 6.0 10/19/2022    A1C 12.3 07/19/2022    A1C 7.6 01/06/2021    A1C 6.1 10/07/2020    A1C 6.4 04/29/2020    A1C 7.5 12/11/2019    A1C 8.2 09/24/2019     Physical exam:  Vitals: There were no vitals taken for this visit.  GEN: well developed, well-nourished, in no acute distress, in a pleasant mood.     SKIN: Bird phototype I  - Total skin " excluding the undergarment areas was performed. The exam included the head/face, neck, both arms, chest, back, abdomen, both legs, digits and/or nails. Excludes Feet per patient preference: Sporadic cherry angiomas present especially on chest and abdomen, also sporadic waxy/stuck on seborrheic keratosis.   - Neck: Diffuse red skin with associated fibrosis along posterior neck with poorly demarcated margins. No overlying scale. Blanchable.  - Groin/Thigh: Well circumscribed erythematous rash with central clearing and no satellite lesions or leading edge   - No other lesions of concern on areas examined.     Past Medical History:   Past Medical History:   Diagnosis Date    Depressive disorder     Diabetes mellitus (H)      Past Surgical History:   Procedure Laterality Date    BIOPSY  07/01/2005    Date estimated    COLONOSCOPY  05/13/2003    I've had 3. Estimated dates 05/13/2003, 05/13/2010, 05/13/201713/    COLONOSCOPY N/A 6/15/2023    Procedure: Colonoscopy;  Surgeon: Hami Graham MD;  Location:  GI    GENITOURINARY SURGERY      KIDNEY STONE SURGERY      ROTATOR CUFF REPAIR RT/LT Right     SOFT TISSUE SURGERY  07/01/2003    Estimated date of muscle biopsy near right biceps       Social History:   reports that he has never smoked. He has never used smokeless tobacco. He reports that he does not drink alcohol and does not use drugs.    Family History:  Family History   Problem Relation Age of Onset    Unknown/Adopted No family hx of        Medications:  Current Outpatient Medications   Medication Sig Dispense Refill    metFORMIN (GLUCOPHAGE XR) 500 MG 24 hr tablet TAKE 1 TABLET(500 MG) BY MOUTH TWICE DAILY WITH MEALS 180 tablet 1    SOLRIAMFETOL 150 MG TABS tablet       tirzepatide (MOUNJARO) 10 MG/0.5ML pen Inject 10 mg Subcutaneous every 7 days 6 mL 1    traZODone (DESYREL) 50 MG tablet Take 1 tablet (50 mg) by mouth at bedtime 90 tablet 1    triamcinolone (KENALOG) 0.1 % external lotion Apply topically  2 times daily 60 mL 1    venlafaxine (EFFEXOR XR) 75 MG 24 hr capsule Take 1 capsule (75 mg) by mouth daily 90 capsule 1    VITAMIN D, CHOLECALCIFEROL, PO Take by mouth daily       Allergies   Allergen Reactions    Seasonal Allergies

## 2024-01-28 LAB
PATH REPORT.COMMENTS IMP SPEC: NORMAL
PATH REPORT.FINAL DX SPEC: NORMAL
PATH REPORT.GROSS SPEC: NORMAL
PATH REPORT.MICROSCOPIC SPEC OTHER STN: NORMAL
PATH REPORT.RELEVANT HX SPEC: NORMAL

## 2024-02-27 NOTE — TELEPHONE ENCOUNTER
FUTURE VISIT INFORMATION      FUTURE VISIT INFORMATION:  Date: 1.16.24  Time: 10:45  Location: [facility]  REFERRAL INFORMATION:  Referring provider:  Kellie  Referring providers clinic:  [facility]  Reason for visit/diagnosis  Morphea; Morphea neck - Referred by Maciej Hopkins MD in  FAMILY PRAC/IM - Recs in Epic - Recs also with [facility] - per Pt Bro      RECORDS REQUESTED FROM:       Clinic name Comments Records Status Imaging Status   [facility] 12.5.23  [facility] Epic    [facility] Received Sent to scanning                              Action 12.31.23 kika   Action Taken Faxed records request to [facility] at [phone].     Action 1.4.24 kika   Action Taken Records received from [facility]. Sent to HIM for scanning into pts chart.        [facility]  Progress Note  Name: Ganesh Bowden I  MRN: [id number]  Unit/Bed#: -01 I Date of Admission: 2/22/2024   Date of Service: 2/27/2024 I Hospital Day: 5    Assessment/Plan   * Mild persistent asthma with acute exacerbation  Assessment & Plan  Patient states he is not feeling better today.  Auscultation reveals moderate wheezing and chest tightness  Will switch p.o. steroids to IV 40 mg every 8 hours  Continue albuterol every 4 hours as needed  Continue fluticasone vilanterol puffer  Continue respiratory protocol  Encourage ambulation  Not hypoxic  Discussed with pulmonary attending    Acute hypokalemia  Assessment & Plan  Bmp ordered with mag- will followup    Leukocytosis  Assessment & Plan  Stable WBC count  Likely steroid related versus reactive from asthma exacerbation  Sputum culture reviewed growing 3+ gram-positive cocci  Blood cultures no growth  Will hold off on using any antibiotics at this time    Dyslipidemia  Assessment & Plan  Prior lipid panel in 2022, ordered by his PCP revealed elevated cholesterol, triglycerides, LDLs.  Currently does not take any medication for this, recommend follow-up outpatient PCP for further medical management    Stage 2 chronic kidney disease  Assessment & Plan  Lab Results   Component Value Date    EGFR 104 02/24/2024    EGFR 105 02/23/2024    EGFR 106 02/22/2024    CREATININE 0.89 02/24/2024    CREATININE 0.88 02/23/2024    CREATININE 0.86 02/22/2024   Patient following with nephrology as an outpatient, last seen in September 2022  Stable renal functions               VTE Pharmacologic Prophylaxis: VTE Score: 3 Moderate Risk (Score 3-4) - Pharmacological DVT Prophylaxis Ordered: heparin.    Mobility:   Basic Mobility Inpatient Raw Score: 24  JH-HLM Goal: 8: Walk 250 feet or more  JH-HLM Achieved: 8: Walk 250 feet ot more  HLM Goal achieved. Continue to encourage appropriate mobility.    Patient Centered Rounds: I performed bedside  rounds with nursing staff today.   Discussions with Specialists or Other Care Team Provider: Yes, pulmonary attending    Education and Discussions with Family / Patient:  Offered to call patient's family, he declined.     Total Time Spent on Date of Encounter in care of patient: 45 mins. This time was spent on one or more of the following: performing physical exam; counseling and coordination of care; obtaining or reviewing history; documenting in the medical record; reviewing/ordering tests, medications or procedures; communicating with other healthcare professionals and discussing with patient's family/caregivers.    Current Length of Stay: 5 day(s)  Current Patient Status: Inpatient   Certification Statement: The patient will continue to require additional inpatient hospital stay due to need for IV steroids and close pulmonary monitoring  Discharge Plan: Anticipate discharge in 24-48 hrs to home.    Code Status: Level 1 - Full Code    Subjective:   Patient seen and examined at bedside during a.m. rounds.  Patient sitting comfortably in his recliner.  States that his chest feels tight and is not able to breathe out comfortably and is having bouts of cough if he tries to breathe out forcefully.  He denies any fever or chest pain or shortness of breath at rest but does feel short of breath and starts to ambulate.  Patient states he was feeling better yesterday and was able to sleep last night but he woke up feeling worse    Objective:     Vitals:   Temp (24hrs), Av °F (36.7 °C), Min:97.8 °F (36.6 °C), Max:98.5 °F (36.9 °C)    Temp:  [97.8 °F (36.6 °C)-98.5 °F (36.9 °C)] 97.8 °F (36.6 °C)  HR:  [73-78] 78  Resp:  [18-20] 20  BP: (121-136)/(75-85) 121/79  SpO2:  [94 %-97 %] 95 %  Body mass index is 22.98 kg/m².     Input and Output Summary (last 24 hours):     Intake/Output Summary (Last 24 hours) at 2024 0926  Last data filed at 2024 0821  Gross per 24 hour   Intake 953 ml   Output --   Net 953 ml        Physical Exam:   Physical Exam General- Awake, alert and oriented x 3, looks comfortable  HEENT- Normocephalic, atraumatic, oral mucosa- moist  Neck- Supple, No carotid bruit, no JVD  CVS- Normal S1/ S2, Regular rate and rhythm, No murmur, No edema  Respiratory system-moderate wheezing bilaterally inspiratory and expiratory   abdomen- Soft, Non distended, no tenderness, Bowel sound- present 4 quads  Genitourinary- No suprapubic tenderness, No CVA tenderness  Skin- No new bruise or rash  Musculoskeletal- No gross deformity  Psych- No acute psychosis  CNS- CN II- XII grossly intact, No acute focal neurologic deficit noted      Additional Data:     Labs:  Results from last 7 days   Lab Units 02/24/24  0519 02/22/24  0600 02/22/24  0053   WBC Thousand/uL 11.51*   < > 10.83*   HEMOGLOBIN g/dL 14.4   < > 15.5   HEMATOCRIT % 42.3   < > 45.3   PLATELETS Thousands/uL 235   < > 251   NEUTROS PCT %  --   --  68   LYMPHS PCT %  --   --  23   MONOS PCT %  --   --  6   EOS PCT %  --   --  2    < > = values in this interval not displayed.     Results from last 7 days   Lab Units 02/24/24  0519   SODIUM mmol/L 136   POTASSIUM mmol/L 4.3   CHLORIDE mmol/L 101   CO2 mmol/L 24   BUN mg/dL 17   CREATININE mg/dL 0.89   ANION GAP mmol/L 11   CALCIUM mg/dL 9.9   GLUCOSE RANDOM mg/dL 128                 Results from last 7 days   Lab Units 02/22/24  0053   LACTIC ACID mmol/L 1.1   PROCALCITONIN ng/ml <0.05       Lines/Drains:  Invasive Devices       Peripheral Intravenous Line  Duration             Peripheral IV 02/22/24 Distal;Right;Upper;Ventral (anterior) Arm 5 days                          Imaging: No pertinent imaging reviewed.    Recent Cultures (last 7 days):   Results from last 7 days   Lab Units 02/25/24 2045 02/24/24 2122   BLOOD CULTURE  No Growth at 24 hrs.  No Growth at 24 hrs.  --    SPUTUM CULTURE   --  3+ Growth of   GRAM STAIN RESULT   --  Rare Epithelial cells per low power field*  3+ Gram positive cocci in  pairs, chains and clusters*  1+ Gram negative diplococci*  Rare Gram positive rods*  Rare Polys*       Last 24 Hours Medication List:   Current Facility-Administered Medications   Medication Dose Route Frequency Provider Last Rate    acetaminophen  650 mg Oral Q6H PRN Waldemar Jaramillo      albuterol  2.5 mg Nebulization Q4H PRN Waldemar Jaramillo      benzonatate  100 mg Oral TID PRN Jay Marcos MD      calcium carbonate  500 mg Oral Daily PRN Waldemar Jaramillo      famotidine  20 mg Oral BID Waldemar Jaramillo      Fluticasone Furoate-Vilanterol  1 puff Inhalation Daily Jessica Torres MD      heparin (porcine)  5,000 Units Subcutaneous Q8H [facility] Beti Packer PA-C      levalbuterol  1.25 mg Nebulization TID Waldemar Jaramillo      lidocaine  1 patch Topical Daily Waldemar Jaramillo      methylPREDNISolone sodium succinate  40 mg Intravenous Q8H [facility] Bhaskar Montes De Oca MD      oxyCODONE  5 mg Oral Q6H PRN Waldemar Jaramillo          Today, Patient Was Seen By: Bhaskar Montes De Oca MD    **Please Note: This note may have been constructed using a voice recognition system.**

## 2024-03-27 ENCOUNTER — OFFICE VISIT (OUTPATIENT)
Dept: PHARMACY | Facility: CLINIC | Age: 59
End: 2024-03-27
Payer: COMMERCIAL

## 2024-03-27 ENCOUNTER — LAB (OUTPATIENT)
Dept: LAB | Facility: CLINIC | Age: 59
End: 2024-03-27
Payer: COMMERCIAL

## 2024-03-27 VITALS
SYSTOLIC BLOOD PRESSURE: 135 MMHG | HEART RATE: 71 BPM | DIASTOLIC BLOOD PRESSURE: 81 MMHG | OXYGEN SATURATION: 100 % | WEIGHT: 181.9 LBS | BODY MASS INDEX: 26.1 KG/M2

## 2024-03-27 DIAGNOSIS — E11.9 TYPE 2 DIABETES MELLITUS WITHOUT COMPLICATION, WITHOUT LONG-TERM CURRENT USE OF INSULIN (H): Primary | ICD-10-CM

## 2024-03-27 DIAGNOSIS — E11.9 TYPE 2 DIABETES MELLITUS WITHOUT COMPLICATION, WITHOUT LONG-TERM CURRENT USE OF INSULIN (H): ICD-10-CM

## 2024-03-27 LAB
ANION GAP SERPL CALCULATED.3IONS-SCNC: 9 MMOL/L (ref 7–15)
BUN SERPL-MCNC: 9.3 MG/DL (ref 8–23)
CALCIUM SERPL-MCNC: 9.3 MG/DL (ref 8.8–10.2)
CHLORIDE SERPL-SCNC: 106 MMOL/L (ref 98–107)
CREAT SERPL-MCNC: 0.8 MG/DL (ref 0.67–1.17)
DEPRECATED HCO3 PLAS-SCNC: 27 MMOL/L (ref 22–29)
EGFRCR SERPLBLD CKD-EPI 2021: >90 ML/MIN/1.73M2
GLUCOSE SERPL-MCNC: 68 MG/DL (ref 70–99)
HBA1C MFR BLD: 5.2 %
POTASSIUM SERPL-SCNC: 4.3 MMOL/L (ref 3.4–5.3)
SODIUM SERPL-SCNC: 142 MMOL/L (ref 135–145)

## 2024-03-27 PROCEDURE — 36415 COLL VENOUS BLD VENIPUNCTURE: CPT | Performed by: PATHOLOGY

## 2024-03-27 PROCEDURE — 99207 PR NO CHARGE LOS: CPT | Performed by: PHARMACIST

## 2024-03-27 PROCEDURE — 80048 BASIC METABOLIC PNL TOTAL CA: CPT | Performed by: PATHOLOGY

## 2024-03-27 PROCEDURE — 99000 SPECIMEN HANDLING OFFICE-LAB: CPT | Performed by: PATHOLOGY

## 2024-03-27 PROCEDURE — 83036 HEMOGLOBIN GLYCOSYLATED A1C: CPT | Performed by: INTERNAL MEDICINE

## 2024-03-27 RX ORDER — TIRZEPATIDE 7.5 MG/.5ML
7.5 INJECTION, SOLUTION SUBCUTANEOUS
Qty: 2 ML | Refills: 3 | Status: SHIPPED | OUTPATIENT
Start: 2024-03-27 | End: 2024-07-31

## 2024-03-27 RX ORDER — METFORMIN HCL 500 MG
500 TABLET, EXTENDED RELEASE 24 HR ORAL 2 TIMES DAILY WITH MEALS
Qty: 180 TABLET | Refills: 1 | Status: SHIPPED | OUTPATIENT
Start: 2024-03-27 | End: 2024-06-10

## 2024-03-27 RX ORDER — ATORVASTATIN CALCIUM 20 MG/1
20 TABLET, FILM COATED ORAL DAILY
Qty: 90 TABLET | Refills: 3 | Status: SHIPPED | OUTPATIENT
Start: 2024-03-27

## 2024-03-27 NOTE — PATIENT INSTRUCTIONS
"Recommendations from today's MTM visit:                                                    MTM (medication therapy management) is a service provided by a clinical pharmacist designed to help you get the most of out of your medicines.   Today we reviewed what your medicines are for, how to know if they are working, that your medicines are safe and how to make your medicine regimen as easy as possible.    Get A1c done and if at goal below 7% we can decrease the dose to 7.5 mg. If above goal will stay at 10 mg dose.   Refilled metformin.   Restart atorvastatin at 20 mg.     Follow-up: Return in about 3 months (around 6/27/2024).    It was great speaking with you today.  I value your experience and would be very thankful for your time in providing feedback in our clinic survey. In the next few days, you may receive an email or text message from [employer] with a link to a survey related to your  clinical pharmacist.\"     To schedule another MTM appointment, please call the clinic directly or you may call the MTM scheduling line at [phone] or toll-free at [phone].     My Clinical Pharmacist's contact information:                                                      Please feel free to contact me with any questions or concerns you have.      Ayan Rahman, Pharm. D., [facility]  Medication Therapy Management Pharmacist    "

## 2024-03-27 NOTE — PROGRESS NOTES
Medication Therapy Management (MTM) Encounter    ASSESSMENT:                            Medication Adherence/Access: No issues identified    Diabetes:   Patient is meeting A1c goal of < 7%. Due for new A1c. Discussed that if A1c is still in the 5% range we can decrease his Mounjaro. His A1c might be a little elevated due to being out of metformin. He is indicated for prophylactic moderate intensity statin.     PLAN:                            Get A1c done and if at goal below 7% we can decrease the dose to 7.5 mg. If above goal will stay at 10 mg dose.   Refilled metformin.   Restart atorvastatin at 20 mg.     Follow-up: Return in about 3 months (around 6/27/2024).    SUBJECTIVE/OBJECTIVE:                          Bro Barton is a 58 year old male coming in for a follow-up visit.       Reason for visit: diabetes follow-up.    Allergies/ADRs: Reviewed in chart  Past Medical History: Reviewed in chart  Tobacco: He reports that he has never smoked. He has never used smokeless tobacco.  Alcohol: none    Medication Adherence/Access: no issues reported    Diabetes   -Metformin  mg two times a day - reports that he has been out of this for a month and needs a refill  -Mounjaro 10 mg injection every 7 days - reports his pharmacy is out of it but has a month left of this.     Had stopped atorvastatin because cholesterol was good. Did not realize he should take this due to his diabetes regardless of his cholesterol. Discussed elevated blood pressure the last few readings. He plans to work on this      Patient reports GI side effects are much better.      Blood sugar monitoring: not needed  Current diabetes symptoms: none  Diet/Exercise: No changes to diet. Still eating less than before. No changes to exercises. Walks twice aweek is aware he needs to improve this       Eye exam is up to date  Foot exam: due  Urine Albumin:   Lab Results   Component Value Date    ALCR  11/03/2023      Comment:      Unable to calculate,  urine albumin and/or urine creatinine is outside detectable limits.  Microalbuminuria is defined as an albumin:creatinine ratio of 17 to 299 for males and 25 to 299 for females. A ratio of albumin:creatinine of 300 or higher is indicative of overt proteinuria.  Due to biologic variability, positive results should be confirmed by a second, first-morning random or 24-hour timed urine specimen. If there is discrepancy, a third specimen is recommended. When 2 out of 3 results are in the microalbuminuria range, this is evidence for incipient nephropathy and warrants increased efforts at glucose control, blood pressure control, and institution of therapy with an angiotensin-converting-enzyme (ACE) inhibitor (if the patient can tolerate it).        Lab Results   Component Value Date    A1C 5.0 11/03/2023         Today's Vitals: /81   Pulse 71   Wt 181 lb 14.4 oz (82.5 kg)   SpO2 100%   BMI 26.10 kg/m    ----------------      I spent 15 minutes with this patient today. All changes were made via collaborative practice agreement with Maciej Hopkins MD. A copy of the visit note was provided to the patient's provider(s).    A summary of these recommendations was sent via [id number].    Ayan Rahman, Pharm. D., [facility]  Medication Therapy Management Pharmacist           Medication Therapy Recommendations  Type 2 diabetes mellitus with diabetic mononeuropathy, with long-term current use of insulin (H)    Current Medication: atorvastatin (LIPITOR) 20 MG tablet   Rationale: Does not understand instructions - Adherence - Adherence   Recommendation: Provide Adherence Intervention - ATORVASTATIN CALCIUM PO   Status: Accepted per CPA          Current Medication: metFORMIN (GLUCOPHAGE XR) 500 MG 24 hr tablet   Rationale: Patient forgets to take - Adherence - Adherence   Recommendation: Provide Adherence Intervention   Status: Accepted per CPA

## 2024-03-29 ENCOUNTER — TELEPHONE (OUTPATIENT)
Dept: PHARMACY | Facility: CLINIC | Age: 59
End: 2024-03-29
Payer: COMMERCIAL

## 2024-03-29 NOTE — TELEPHONE ENCOUNTER
Left Voicemail (1st Attempt) and Sent Mychart (1st Attempt) for the patient to call back and schedule the following:    Appointment type: RETURN - MTM  Provider: Ayan Rahman RPH  Return date: 6/28/2024    Additonal Notes: any mode is okay

## 2024-04-05 ENCOUNTER — TELEPHONE (OUTPATIENT)
Dept: PHARMACY | Facility: CLINIC | Age: 59
End: 2024-04-05
Payer: COMMERCIAL

## 2024-04-22 DIAGNOSIS — F33.1 MAJOR DEPRESSIVE DISORDER, RECURRENT EPISODE, MODERATE (H): ICD-10-CM

## 2024-04-23 DIAGNOSIS — F33.1 MAJOR DEPRESSIVE DISORDER, RECURRENT EPISODE, MODERATE (H): ICD-10-CM

## 2024-04-23 RX ORDER — TRAZODONE HYDROCHLORIDE 50 MG/1
50 TABLET, FILM COATED ORAL AT BEDTIME
Qty: 90 TABLET | Refills: 3 | Status: SHIPPED | OUTPATIENT
Start: 2024-04-23

## 2024-04-23 RX ORDER — VENLAFAXINE HYDROCHLORIDE 75 MG/1
75 CAPSULE, EXTENDED RELEASE ORAL DAILY
Qty: 90 CAPSULE | Refills: 3 | OUTPATIENT
Start: 2024-04-23

## 2024-04-29 ENCOUNTER — TELEPHONE (OUTPATIENT)
Dept: PHARMACY | Facility: CLINIC | Age: 59
End: 2024-04-29
Payer: COMMERCIAL

## 2024-05-05 DIAGNOSIS — F33.1 MAJOR DEPRESSIVE DISORDER, RECURRENT EPISODE, MODERATE (H): ICD-10-CM

## 2024-05-06 RX ORDER — VENLAFAXINE HYDROCHLORIDE 75 MG/1
75 CAPSULE, EXTENDED RELEASE ORAL DAILY
Qty: 90 CAPSULE | Refills: 3 | Status: SHIPPED | OUTPATIENT
Start: 2024-05-06

## 2024-05-09 ENCOUNTER — TELEPHONE (OUTPATIENT)
Dept: PHARMACY | Facility: CLINIC | Age: 59
End: 2024-05-09
Payer: COMMERCIAL

## 2024-06-01 ENCOUNTER — HEALTH MAINTENANCE LETTER (OUTPATIENT)
Age: 59
End: 2024-06-01

## 2024-06-07 SDOH — HEALTH STABILITY: PHYSICAL HEALTH: ON AVERAGE, HOW MANY DAYS PER WEEK DO YOU ENGAGE IN MODERATE TO STRENUOUS EXERCISE (LIKE A BRISK WALK)?: 3 DAYS

## 2024-06-07 SDOH — HEALTH STABILITY: PHYSICAL HEALTH: ON AVERAGE, HOW MANY MINUTES DO YOU ENGAGE IN EXERCISE AT THIS LEVEL?: 60 MIN

## 2024-06-07 ASSESSMENT — SOCIAL DETERMINANTS OF HEALTH (SDOH): HOW OFTEN DO YOU GET TOGETHER WITH FRIENDS OR RELATIVES?: MORE THAN THREE TIMES A WEEK

## 2024-06-10 ENCOUNTER — OFFICE VISIT (OUTPATIENT)
Dept: FAMILY MEDICINE | Facility: CLINIC | Age: 59
End: 2024-06-10
Payer: COMMERCIAL

## 2024-06-10 VITALS
WEIGHT: 183 LBS | DIASTOLIC BLOOD PRESSURE: 79 MMHG | BODY MASS INDEX: 26.2 KG/M2 | SYSTOLIC BLOOD PRESSURE: 132 MMHG | HEART RATE: 74 BPM | OXYGEN SATURATION: 100 % | RESPIRATION RATE: 16 BRPM | HEIGHT: 70 IN | TEMPERATURE: 96.9 F

## 2024-06-10 DIAGNOSIS — F33.1 MAJOR DEPRESSIVE DISORDER, RECURRENT EPISODE, MODERATE (H): ICD-10-CM

## 2024-06-10 DIAGNOSIS — E11.42 DIABETIC PERIPHERAL NEUROPATHY (H): ICD-10-CM

## 2024-06-10 DIAGNOSIS — Z00.00 ENCOUNTER FOR PREVENTIVE CARE: Primary | ICD-10-CM

## 2024-06-10 LAB
ALBUMIN SERPL BCG-MCNC: 4.5 G/DL (ref 3.5–5.2)
ALP SERPL-CCNC: 51 U/L (ref 40–150)
ALT SERPL W P-5'-P-CCNC: 13 U/L (ref 0–70)
ANION GAP SERPL CALCULATED.3IONS-SCNC: 10 MMOL/L (ref 7–15)
AST SERPL W P-5'-P-CCNC: 19 U/L (ref 0–45)
BASOPHILS # BLD AUTO: 0 10E3/UL (ref 0–0.2)
BASOPHILS NFR BLD AUTO: 1 %
BILIRUB SERPL-MCNC: 0.8 MG/DL
BUN SERPL-MCNC: 10.1 MG/DL (ref 8–23)
CALCIUM SERPL-MCNC: 9.4 MG/DL (ref 8.8–10.2)
CHLORIDE SERPL-SCNC: 107 MMOL/L (ref 98–107)
CHOLEST SERPL-MCNC: 131 MG/DL
CREAT SERPL-MCNC: 0.89 MG/DL (ref 0.67–1.17)
DEPRECATED HCO3 PLAS-SCNC: 27 MMOL/L (ref 22–29)
EGFRCR SERPLBLD CKD-EPI 2021: >90 ML/MIN/1.73M2
EOSINOPHIL # BLD AUTO: 0.2 10E3/UL (ref 0–0.7)
EOSINOPHIL NFR BLD AUTO: 4 %
ERYTHROCYTE [DISTWIDTH] IN BLOOD BY AUTOMATED COUNT: 13.2 % (ref 10–15)
FASTING STATUS PATIENT QL REPORTED: YES
FASTING STATUS PATIENT QL REPORTED: YES
GLUCOSE SERPL-MCNC: 116 MG/DL (ref 70–99)
HBA1C MFR BLD: 5.4 % (ref 0–5.6)
HCT VFR BLD AUTO: 43.3 % (ref 40–53)
HDLC SERPL-MCNC: 54 MG/DL
HGB BLD-MCNC: 14.2 G/DL (ref 13.3–17.7)
IMM GRANULOCYTES # BLD: 0 10E3/UL
IMM GRANULOCYTES NFR BLD: 0 %
LDLC SERPL CALC-MCNC: 67 MG/DL
LYMPHOCYTES # BLD AUTO: 1.4 10E3/UL (ref 0.8–5.3)
LYMPHOCYTES NFR BLD AUTO: 33 %
MCH RBC QN AUTO: 29.1 PG (ref 26.5–33)
MCHC RBC AUTO-ENTMCNC: 32.8 G/DL (ref 31.5–36.5)
MCV RBC AUTO: 89 FL (ref 78–100)
MONOCYTES # BLD AUTO: 0.4 10E3/UL (ref 0–1.3)
MONOCYTES NFR BLD AUTO: 10 %
NEUTROPHILS # BLD AUTO: 2.2 10E3/UL (ref 1.6–8.3)
NEUTROPHILS NFR BLD AUTO: 52 %
NONHDLC SERPL-MCNC: 77 MG/DL
PLATELET # BLD AUTO: 257 10E3/UL (ref 150–450)
POTASSIUM SERPL-SCNC: 4.8 MMOL/L (ref 3.4–5.3)
PROT SERPL-MCNC: 7 G/DL (ref 6.4–8.3)
PSA SERPL DL<=0.01 NG/ML-MCNC: 1.83 NG/ML (ref 0–4.5)
RBC # BLD AUTO: 4.88 10E6/UL (ref 4.4–5.9)
SODIUM SERPL-SCNC: 144 MMOL/L (ref 135–145)
TRIGL SERPL-MCNC: 50 MG/DL
TSH SERPL DL<=0.005 MIU/L-ACNC: 1.39 UIU/ML (ref 0.3–4.2)
WBC # BLD AUTO: 4.2 10E3/UL (ref 4–11)

## 2024-06-10 PROCEDURE — 99396 PREV VISIT EST AGE 40-64: CPT | Performed by: INTERNAL MEDICINE

## 2024-06-10 PROCEDURE — 80061 LIPID PANEL: CPT | Performed by: INTERNAL MEDICINE

## 2024-06-10 PROCEDURE — 36415 COLL VENOUS BLD VENIPUNCTURE: CPT | Performed by: INTERNAL MEDICINE

## 2024-06-10 PROCEDURE — G0103 PSA SCREENING: HCPCS | Performed by: INTERNAL MEDICINE

## 2024-06-10 PROCEDURE — 85025 COMPLETE CBC W/AUTO DIFF WBC: CPT | Performed by: INTERNAL MEDICINE

## 2024-06-10 PROCEDURE — 84443 ASSAY THYROID STIM HORMONE: CPT | Performed by: INTERNAL MEDICINE

## 2024-06-10 PROCEDURE — 83036 HEMOGLOBIN GLYCOSYLATED A1C: CPT | Performed by: INTERNAL MEDICINE

## 2024-06-10 PROCEDURE — 99214 OFFICE O/P EST MOD 30 MIN: CPT | Mod: 25 | Performed by: INTERNAL MEDICINE

## 2024-06-10 PROCEDURE — 80053 COMPREHEN METABOLIC PANEL: CPT | Performed by: INTERNAL MEDICINE

## 2024-06-10 RX ORDER — METFORMIN HCL 500 MG
500 TABLET, EXTENDED RELEASE 24 HR ORAL 2 TIMES DAILY WITH MEALS
Qty: 180 TABLET | Refills: 1 | Status: SHIPPED | OUTPATIENT
Start: 2024-06-10

## 2024-06-10 ASSESSMENT — PATIENT HEALTH QUESTIONNAIRE - PHQ9
SUM OF ALL RESPONSES TO PHQ QUESTIONS 1-9: 12
SUM OF ALL RESPONSES TO PHQ QUESTIONS 1-9: 12
10. IF YOU CHECKED OFF ANY PROBLEMS, HOW DIFFICULT HAVE THESE PROBLEMS MADE IT FOR YOU TO DO YOUR WORK, TAKE CARE OF THINGS AT HOME, OR GET ALONG WITH OTHER PEOPLE: SOMEWHAT DIFFICULT

## 2024-06-10 ASSESSMENT — PAIN SCALES - GENERAL: PAINLEVEL: MODERATE PAIN (5)

## 2024-06-10 NOTE — PROGRESS NOTES
"Preventive Care Visit  [facility]  Maciej Hopkins MD, Internal Medicine  Dariel 10, 2024      Assessment & Plan     Encounter for preventive care  Age and gender appropriate preventive care and screenings are discussed.  Particular attention to personal preventive care and age appropriate lifestyle including the incorporation of healthy diet and physical activity is made.      - CBC with Platelets & Differential  - Comprehensive metabolic panel  - TSH with free T4 reflex  - Lipid panel reflex to direct LDL Fasting  - PSA, screen    Diabetic peripheral neuropathy (H)  Patient has had a drastic A1c reduction with Mounjaro.  Remains to be seen whether this is cost effective for him in the future.  He sees clinical pharmacy.  Metformin is refilled.  A1c today.    Return to clinic in 6 months.  - metFORMIN (GLUCOPHAGE XR) 500 MG 24 hr tablet  Dispense: 180 tablet; Refill: 1  - Hemoglobin A1c    Major depressive disorder, recurrent episode, moderate (H)  On 75 mg of venlafaxine.  He is having what appears to be TMJ pain.  Admits to significant stress and anxiety.  Not wanting to increase the venlafaxine at present.  For some reason he is taking it at night along with his trazodone.  I suggest taking the venlafaxine in the morning instead.    As he does have some peripheral neuropathy we could consider adding gabapentin to see if this helps with any of his TMJ issues.    Botox was suggested through his dentist.  The patient is not wanting to move forward with that.          BMI  Estimated body mass index is 26.26 kg/m  as calculated from the following:    Height as of this encounter: 1.778 m (5' 10\").    Weight as of this encounter: 83 kg (183 lb).       Counseling  Appropriate preventive services were discussed with this patient, including applicable screening as appropriate for fall prevention, nutrition, physical activity, Tobacco-use cessation, weight loss and cognition.  Checklist reviewing " preventive services available has been given to the patient.  Reviewed patient's diet, addressing concerns and/or questions.   He is at risk for lack of exercise and has been provided with information to increase physical activity for the benefit of his well-being.   The patient's PHQ-9 score is consistent with moderate depression. He was provided with information regarding depression.           Emilie Crabtree is a 59 year old, presenting for the following:  Physical        6/10/2024     9:19 AM   Additional Questions   Roomed by Krysta        Health Care Directive  Patient does not have a Health Care Directive or Living Will: Discussed advance care planning with patient; information given to patient to review.    HPI    DM  Now on Mounjaro.  Has had good A1c reduction.  Not eating as much.    He is on Metformin also.  He does have peripheral neuropathy.  Not on medication for this.    Depression:  On venlafaxine.  He was actually weaned down to 75 mg.  Patient is concerned about increased stress.  He feels like he is always stressed out.  He also has TMJ and chronic jaw pain.  Wears a mouthguard with little results.              6/7/2024   General Health   How would you rate your overall physical health? (!) FAIR   Feel stress (tense, anxious, or unable to sleep) Very much   (!) STRESS CONCERN      6/7/2024   Nutrition   Three or more servings of calcium each day? (!) NO   Diet: Diabetic   How many servings of fruit and vegetables per day? (!) 0-1   How many sweetened beverages each day? 0-1         6/7/2024   Exercise   Days per week of moderate/strenous exercise 3 days   Average minutes spent exercising at this level 60 min         6/7/2024   Social Factors   Frequency of gathering with friends or relatives More than three times a week   Worry food won't last until get money to buy more No   Food not last or not have enough money for food? No   Do you have housing?  Yes   Are you worried about losing your  "housing? No   Lack of transportation? No   Unable to get utilities (heat,electricity)? No         6/7/2024   Fall Risk   Fallen 2 or more times in the past year? No   Trouble with walking or balance? No          6/7/2024   Dental   Dentist two times every year? Yes         6/7/2024   TB Screening   Were you born outside of the [place]? No       Today's PHQ-9 Score:       6/10/2024     8:36 AM   PHQ-9 SCORE   PHQ-9 Total Score MyChart 12 (Moderate depression)   PHQ-9 Total Score 12         6/7/2024   Substance Use   Alcohol more than 3/day or more than 7/wk No   Do you use any other substances recreationally? No     Social History     Tobacco Use    Smoking status: Never    Smokeless tobacco: Never   Vaping Use    Vaping status: Never Used   Substance Use Topics    Alcohol use: No    Drug use: No           6/7/2024   STI Screening   New sexual partner(s) since last STI/HIV test? No   Last PSA:   Prostate Specific Antigen Screen   Date Value Ref Range Status   04/12/2023 2.06 0.00 - 3.50 ng/mL Final     ASCVD Risk   The 10-year ASCVD risk score (Krysten CHOE, et al., 2019) is: 12%    Values used to calculate the score:      Age: 59 years      Sex: Male      Is Non- : No      Diabetic: Yes      Tobacco smoker: No      Systolic Blood Pressure: 132 mmHg      Is BP treated: No      HDL Cholesterol: 62 mg/dL      Total Cholesterol: 145 mg/dL           Reviewed and updated as needed this visit by Provider                             Objective    Exam  /79 (BP Location: Right arm, Patient Position: Chair, Cuff Size: Adult Large)   Pulse 74   Temp 96.9  F (36.1  C) (Temporal)   Resp 16   Ht 1.778 m (5' 10\")   Wt 83 kg (183 lb)   SpO2 100%   BMI 26.26 kg/m     Estimated body mass index is 26.26 kg/m  as calculated from the following:    Height as of this encounter: 1.778 m (5' 10\").    Weight as of this encounter: 83 kg (183 lb).  Wt Readings from Last 3 Encounters:   06/10/24 83 kg (183 " lb)   03/27/24 82.5 kg (181 lb 14.4 oz)   12/19/23 78.7 kg (173 lb 9.6 oz)         Physical Exam  GENERAL: alert and no distress  EYES: Eyes grossly normal to inspection, PERRL and conjunctivae and sclerae normal  HENT: ear canals and TM's normal, nose and mouth without ulcers or lesions  NECK: no adenopathy, no asymmetry, masses, or scars  RESP: lungs clear to auscultation - no rales, rhonchi or wheezes  CV: regular rate and rhythm, normal S1 S2, no S3 or S4, no murmur, click or rub, no peripheral edema  ABDOMEN: soft, nontender, no hepatosplenomegaly, no masses and bowel sounds normal  MS: no gross musculoskeletal defects noted, no edema  SKIN: no suspicious lesions or rashes  NEURO: Normal strength and tone, mentation intact and speech normal  PSYCH: mentation appears normal, affect normal/bright        Signed Electronically by: Maciej Hopkins MD    Answers submitted by the patient for this visit:  Patient Health Questionnaire (Submitted on 6/10/2024)  If you checked off any problems, how difficult have these problems made it for you to do your work, take care of things at home, or get along with other people?: Somewhat difficult  PHQ9 TOTAL SCORE: 12

## 2024-07-12 ENCOUNTER — OFFICE VISIT (OUTPATIENT)
Dept: PHARMACY | Facility: CLINIC | Age: 59
End: 2024-07-12
Payer: COMMERCIAL

## 2024-07-12 VITALS
HEART RATE: 71 BPM | WEIGHT: 180.7 LBS | OXYGEN SATURATION: 98 % | BODY MASS INDEX: 25.93 KG/M2 | SYSTOLIC BLOOD PRESSURE: 137 MMHG | DIASTOLIC BLOOD PRESSURE: 87 MMHG

## 2024-07-12 DIAGNOSIS — E11.9 TYPE 2 DIABETES MELLITUS WITHOUT COMPLICATION, WITHOUT LONG-TERM CURRENT USE OF INSULIN (H): Primary | ICD-10-CM

## 2024-07-12 PROCEDURE — 99207 PR NO CHARGE LOS: CPT | Performed by: PHARMACIST

## 2024-07-12 NOTE — PATIENT INSTRUCTIONS
"Recommendations from today's MTM visit:                                                    MTM (medication therapy management) is a service provided by a clinical pharmacist designed to help you get the most of out of your medicines.   Today we reviewed what your medicines are for, how to know if they are working, that your medicines are safe and how to make your medicine regimen as easy as possible.    Due to A1c at goal - okay to discontinue metformin once you run out.   Okay to continue 7.5 mg Mounjaro weekly.     Follow-up: Return in about 3 months (around 10/12/2024) for Follow up with new MTM pharmacist.    It was great speaking with you today.  I value your experience and would be very thankful for your time in providing feedback in our clinic survey. In the next few days, you may receive an email or text message from [employer] with a link to a survey related to your  clinical pharmacist.\"     To schedule another MTM appointment, please call the clinic directly or you may call the MTM scheduling line at [phone] or toll-free at [phone].     My Clinical Pharmacist's contact information:                                                      Please feel free to contact me with any questions or concerns you have.      Ayan Rahman, Pharm. D., [facility]  Medication Therapy Management Pharmacist    "

## 2024-07-12 NOTE — PROGRESS NOTES
Medication Therapy Management (MTM) Encounter    ASSESSMENT:                            Medication Adherence/Access: No issues identified    Diabetes:   Patient is meeting A1c goal of < 7%. Wondering if he can stop metformin once he runs out. This should be fine and to reassess A1c three months after stopping this.     PLAN:                            Due to A1c at goal - okay to discontinue metformin once you run out.   Okay to continue 7.5 mg Mounjaro weekly.     Follow-up: Return in about 3 months (around 10/12/2024) for Follow up with new MTM pharmacist.    SUBJECTIVE/OBJECTIVE:                          Bor Barton is a 59 year old male seen for a follow-up visit.       Reason for visit: diabetes follow-up.    Allergies/ADRs: Reviewed in chart  Past Medical History: Reviewed in chart  Tobacco: He reports that he has never smoked. He has never used smokeless tobacco.  Alcohol: none    Medication Adherence/Access: no issues reported    Diabetes   -Metformin  mg two times a day  -Mounjaro 7.5 mg injection every 7 days - had some issues getting this and was using the 10 mg before this. He was worried about insurance coverage but it was covered.     Atorvastatin 20 mg daily    Blood sugar monitoring: not needed  Current diabetes symptoms: none  Diet/Exercise: Reports he has had some stressful times in the last two months and diet has gone poorly. He intends to improve this and feels confident.        Eye exam in the last 12 months? No - eye exam in September. Reports he has had some blurriness in his eyesight.   Foot exam: due      Today's Vitals: /87   Pulse 71   Wt 180 lb 11.2 oz (82 kg)   SpO2 98%   BMI 25.93 kg/m    ----------------      I spent 15 minutes with this patient today. All changes were made via collaborative practice agreement with Maciej Hopkins MD. A copy of the visit note was provided to the patient's provider(s).    A summary of these recommendations was sent via  [employer].    Ayan Rahman, Pharm. D., BCACP  Medication Therapy Management Pharmacist           Medication Therapy Recommendations  No medication therapy recommendations to display

## 2024-07-30 DIAGNOSIS — E11.9 TYPE 2 DIABETES MELLITUS WITHOUT COMPLICATION, WITHOUT LONG-TERM CURRENT USE OF INSULIN (H): ICD-10-CM

## 2024-07-31 RX ORDER — TIRZEPATIDE 7.5 MG/.5ML
INJECTION, SOLUTION SUBCUTANEOUS
Qty: 6 ML | Refills: 0 | Status: SHIPPED | OUTPATIENT
Start: 2024-07-31 | End: 2024-10-02

## 2024-09-18 ENCOUNTER — TRANSFERRED RECORDS (OUTPATIENT)
Dept: HEALTH INFORMATION MANAGEMENT | Facility: CLINIC | Age: 59
End: 2024-09-18
Payer: COMMERCIAL

## 2024-09-18 LAB — RETINOPATHY: NEGATIVE

## 2024-10-01 DIAGNOSIS — E11.9 TYPE 2 DIABETES MELLITUS WITHOUT COMPLICATION, WITHOUT LONG-TERM CURRENT USE OF INSULIN (H): ICD-10-CM

## 2024-10-08 ENCOUNTER — OFFICE VISIT (OUTPATIENT)
Dept: OTOLARYNGOLOGY | Facility: CLINIC | Age: 59
End: 2024-10-08
Payer: COMMERCIAL

## 2024-10-08 VITALS
SYSTOLIC BLOOD PRESSURE: 153 MMHG | BODY MASS INDEX: 25.54 KG/M2 | WEIGHT: 178.4 LBS | HEART RATE: 79 BPM | OXYGEN SATURATION: 100 % | DIASTOLIC BLOOD PRESSURE: 98 MMHG | HEIGHT: 70 IN

## 2024-10-08 DIAGNOSIS — D10.30 FIBROMA OF MOUTH: Primary | ICD-10-CM

## 2024-10-08 PROCEDURE — 99213 OFFICE O/P EST LOW 20 MIN: CPT | Performed by: OTOLARYNGOLOGY

## 2024-10-08 ASSESSMENT — PAIN SCALES - GENERAL: PAINLEVEL: NO PAIN (0)

## 2024-10-08 NOTE — LETTER
"10/8/2024       RE: Bro Barton  [address] [place] [address]     Dear Colleague,    Thank you for referring your patient, Bro Barton, to the [facility] [place] [facility]. Please see a copy of my visit note below.      Otolaryngology Clinic    Name: Bro Barton  MRN: [id number]  Age: 59 year old  : 1965  10/08/2024      Chief Complaint:   Follow up     History of Present Illness:   Bro Barton is a 59 year old male with a history of oral lesions who presents for follow up.     Review of Systems:   Pertinent items are noted in HPI or as in patient entered ROS below, remainder of complete ROS is negative.       10/7/2024     3:44 PM    ENT ROS   Constitutional Unexplained fatigue   Musculoskeletal Neck pain        Physical Exam:   BP (!) 153/98   Pulse 79   Ht 1.778 m (5' 10\")   Wt 80.9 kg (178 lb 6.4 oz)   SpO2 100%   BMI 25.60 kg/m       PHYSICAL EXAMINATION:    Constitutional:  The patient was unaccompanied, well-groomed, and in no acute distress.    Skin:  Warm and pink.    Neurologic:  Alert and oriented x 3.  CN's III-XII within normal limits.  Voice normal.   Psychiatric:  The patient's affect was calm, cooperative, and appropriate.    Respiratory:  Breathing comfortably without stridor or exertion of accessory muscles.    Eyes: Extraocular movement intact.    Head:  Normocephalic and atraumatic.  No lesions or scars.    Ears:  Pinnae and tragus non-tender.  EAC's and TM's were clear.    Nose:  Sinuses were non-tender.  Anterior rhinoscopy revealed midline septum and absence of purulence or polyps.    OC/OP:  Normal tongue, floor of mouth, buccal mucosa, and palate.  No lesions or masses on inspection or palpation.  No abnormal lymph tissue in the oropharynx.  The pterygoid region is non-tender.    Neck:  Supple with normal laryngeal and tracheal landmarks.  The parotid beds were " without masses.  No palpable thyroid.  Lymphatic:  There is no palpable lymphadenopathy in the neck. 1 cm fibromatous appearing mass left buccal area.      Assessment and Plan:  Oral lesion    Follow-up: As needed.        Scribe Disclosure:  Vernon LEW, am serving as a scribe to document services personally performed by Tobias Govea MD at this visit, based upon the provider's statements to me. All documentation has been reviewed by the aforementioned provider prior to being entered into the official medical record.         Again, thank you for allowing me to participate in the care of your patient.      Sincerely,    Tobias Govea MD

## 2024-10-08 NOTE — PATIENT INSTRUCTIONS
You were seen in the ENT Clinic today by Dr. Govea. If you have any questions or concerns after your appointment, please contact us (see below)      2.   Plan to return to the ENT clinic as needed            How to Contact Us:  Send a [employer] message to your provider. Our team will respond to you via [employer]. Occasionally, we will need to call you to get further information.  For urgent matters (Monday-Friday), call the ENT Clinic: [phone] and speak with a call center team member - they will route your call appropriately.   If you'd like to speak directly with a nurse, please find our contact information below. We do our best to check voicemail frequently throughout the day, and will work to call you back within 1-2 days. For urgent matters, please use the general clinic phone numbers listed above.     ANIBAL Rodriguez  Direct: [phone]  Briana DE LA TORRE LPN  Direct: [phone]         [facility]  Department of Otolaryngology

## 2024-10-08 NOTE — PROGRESS NOTES
"  Otolaryngology Clinic    Name: Bor Barton  MRN: [id number]  Age: 59 year old  : 1965  10/08/2024      Chief Complaint:   Follow up     History of Present Illness:   Bro Barton is a 59 year old male with a history of oral lesions who presents for follow up.     Review of Systems:   Pertinent items are noted in HPI or as in patient entered ROS below, remainder of complete ROS is negative.       10/7/2024     3:44 PM    ENT ROS   Constitutional Unexplained fatigue   Musculoskeletal Neck pain        Physical Exam:   BP (!) 153/98   Pulse 79   Ht 1.778 m (5' 10\")   Wt 80.9 kg (178 lb 6.4 oz)   SpO2 100%   BMI 25.60 kg/m       PHYSICAL EXAMINATION:    Constitutional:  The patient was unaccompanied, well-groomed, and in no acute distress.    Skin:  Warm and pink.    Neurologic:  Alert and oriented x 3.  CN's III-XII within normal limits.  Voice normal.   Psychiatric:  The patient's affect was calm, cooperative, and appropriate.    Respiratory:  Breathing comfortably without stridor or exertion of accessory muscles.    Eyes: Extraocular movement intact.    Head:  Normocephalic and atraumatic.  No lesions or scars.    Ears:  Pinnae and tragus non-tender.  EAC's and TM's were clear.    Nose:  Sinuses were non-tender.  Anterior rhinoscopy revealed midline septum and absence of purulence or polyps.    OC/OP:  Normal tongue, floor of mouth, buccal mucosa, and palate.  No lesions or masses on inspection or palpation.  No abnormal lymph tissue in the oropharynx.  The pterygoid region is non-tender.    Neck:  Supple with normal laryngeal and tracheal landmarks.  The parotid beds were without masses.  No palpable thyroid.  Lymphatic:  There is no palpable lymphadenopathy in the neck. 1 cm fibromatous appearing mass left buccal area.      Assessment and Plan:  Oral lesion    Follow-up: As needed.        Scribe Disclosure:  Vernon LEW, am serving as a scribe to document services personally performed by Tobias" Bay Govea MD at this visit, based upon the provider's statements to me. All documentation has been reviewed by the aforementioned provider prior to being entered into the official medical record.

## 2024-10-28 DIAGNOSIS — E11.42 DIABETIC PERIPHERAL NEUROPATHY (H): ICD-10-CM

## 2024-10-29 RX ORDER — METFORMIN HYDROCHLORIDE 500 MG/1
500 TABLET, EXTENDED RELEASE ORAL 2 TIMES DAILY WITH MEALS
Qty: 180 TABLET | Refills: 0 | Status: SHIPPED | OUTPATIENT
Start: 2024-10-29

## 2024-11-20 ENCOUNTER — OFFICE VISIT (OUTPATIENT)
Dept: PHARMACY | Facility: CLINIC | Age: 59
End: 2024-11-20
Payer: COMMERCIAL

## 2024-11-20 DIAGNOSIS — F33.1 MAJOR DEPRESSIVE DISORDER, RECURRENT EPISODE, MODERATE (H): ICD-10-CM

## 2024-11-20 DIAGNOSIS — G47.00 INSOMNIA, UNSPECIFIED TYPE: ICD-10-CM

## 2024-11-20 DIAGNOSIS — R40.0 SOMNOLENCE, DAYTIME: ICD-10-CM

## 2024-11-20 DIAGNOSIS — F41.1 ANXIETY STATE: ICD-10-CM

## 2024-11-20 DIAGNOSIS — E11.9 TYPE 2 DIABETES MELLITUS WITHOUT COMPLICATION, WITHOUT LONG-TERM CURRENT USE OF INSULIN (H): Primary | ICD-10-CM

## 2024-11-20 PROCEDURE — 99207 PR NO CHARGE LOS: CPT

## 2024-11-20 NOTE — PROGRESS NOTES
Medication Therapy Management (MTM) Encounter    ASSESSMENT:                            Medication Adherence/Access: No issues identified.    Diabetes  Stable. Patient is meeting A1c goal of <7%.  Patient will be due for A1c recheck on 12/10 and is due for ACR.  Patient would benefit from stopping metformin once current supply is gone (as recommended at last MTM visit).    Chronic Fatigue, Depression, Anxiety, Insomnia  Not well controlled at this time. Patient would benefit from seeing sleep provider next month. Discussed labs that could be checked for potential causes of fatigue (recent CBC, CMP, and TSH are normal). Patient is taking recommended maintenance dose of vitamin D, but could consider asking PCP to check a vitamin D level.    PLAN:                            Stop metformin once current supply is gone  Recheck A1c and urine protein next month  Could consider asking Dr. Hopkins to check a vitamin D level    Follow-up: in clinic on  Wednesday Feb 26, 2025   Appt at 12:30 PM (1 hr)     SUBJECTIVE/OBJECTIVE:                          Bro Barton is a 59 year old male seen for a follow-up visit.   Patient was previously following with MTM pharmacist, Ayan Rahman.    Reason for visit: 3 month follow up    Allergies/ADRs: Reviewed in chart  Past Medical History: Reviewed in chart  Tobacco: He reports that he has never smoked. He has never used smokeless tobacco.  Alcohol: none    Medication Adherence/Access: no issues reported.    Diabetes   Metformin  mg twice daily  Mounjaro 7.5 mg weekly  Atorvastatin 20 mg daily  Feels Mounjaro is going well.  Reports no medication side effects at this time.  Current diabetes symptoms: None  Plans to stop metformin once current supply is gone.     Blood sugar monitoring: never  Eye exam is up to date  Foot exam: due     Lab Results   Component Value Date    A1C 5.4 06/10/2024    A1C 5.2 03/27/2024    A1C 5.0 11/03/2023    A1C 6.4 04/12/2023    A1C 5.5 12/27/2022     A1C 7.6 01/06/2021    A1C 6.1 10/07/2020    A1C 6.4 04/29/2020    A1C 7.5 12/11/2019    A1C 8.2 09/24/2019     Chronic Fatigue, Depression, Anxiety, Insomnia  Solriamfetol 150 mg daily as needed  Trazodone 50 mg at bedtime  Venlafaxine 75 mg daily  Vitamin D 1000 units daily  Takes first thing in the morning before work, is helpful. Doesn't take every day because it increases anxiety. Has tried alternatives in the past, but has found solriamfetol works best. Overall, mood is good. Doesn't think solriamfetol impacts sleep. Is able to fall asleep and stay asleep, but doesn't feel well rested. Has been using a CPAP for almost 15 years. Will be following up with sleep provider next month. Wonders if any labs could be checked for this.    Today's Vitals: There were no vitals taken for this visit.  ----------------    I spent 60 minutes with this patient today. All changes were made via collaborative practice agreement with Maciej Hopkins MD. A copy of the visit note was provided to the patient's provider(s).    A summary of these recommendations was sent via [employer].    Rajendra Coronel PharmD (Hailie), MPH  Medication Therapy Management Pharmacist      Medication Therapy Recommendations  No medication therapy recommendations to display

## 2024-11-23 NOTE — TELEPHONE ENCOUNTER
. Please see new triage encounter.   
Please triage positive  COVID-19 patient  and route to RN hub for follow up  
EKG completed

## 2024-11-25 NOTE — PATIENT INSTRUCTIONS
"Recommendations from today's MTM visit:                                                       Stop metformin once current supply is gone  Recheck A1c and urine protein next month  Could consider asking Dr. Hopkins to check a vitamin D level    Follow-up: in clinic on  Wednesday Feb 26, 2025   Appt at 12:30 PM (1 hr)     It was great speaking with you today.  I value your experience and would be very thankful for your time in providing feedback in our clinic survey. In the next few days, you may receive an email or text message from [email or web address] with a link to a survey related to your  clinical pharmacist.\"     To schedule another MTM appointment, please call the clinic directly or you may call the MTM scheduling line at [phone] or toll-free at [phone].     My Clinical Pharmacist's contact information:                                                      Please feel free to contact me with any questions or concerns you have.      Rajendra Coronel (Hailie), PharmD, MPH  Medication Therapy Management Pharmacist    "

## 2024-12-03 DIAGNOSIS — E11.9 TYPE 2 DIABETES MELLITUS WITHOUT COMPLICATION, WITHOUT LONG-TERM CURRENT USE OF INSULIN (H): ICD-10-CM

## 2024-12-04 RX ORDER — TIRZEPATIDE 7.5 MG/.5ML
INJECTION, SOLUTION SUBCUTANEOUS
Qty: 6 ML | Refills: 1 | Status: SHIPPED | OUTPATIENT
Start: 2024-12-04

## 2024-12-22 ENCOUNTER — HEALTH MAINTENANCE LETTER (OUTPATIENT)
Age: 59
End: 2024-12-22

## 2025-01-13 ASSESSMENT — PATIENT HEALTH QUESTIONNAIRE - PHQ9
10. IF YOU CHECKED OFF ANY PROBLEMS, HOW DIFFICULT HAVE THESE PROBLEMS MADE IT FOR YOU TO DO YOUR WORK, TAKE CARE OF THINGS AT HOME, OR GET ALONG WITH OTHER PEOPLE: SOMEWHAT DIFFICULT
SUM OF ALL RESPONSES TO PHQ QUESTIONS 1-9: 9
SUM OF ALL RESPONSES TO PHQ QUESTIONS 1-9: 9

## 2025-01-14 ENCOUNTER — OFFICE VISIT (OUTPATIENT)
Dept: FAMILY MEDICINE | Facility: CLINIC | Age: 60
End: 2025-01-14
Payer: COMMERCIAL

## 2025-01-14 VITALS
HEIGHT: 70 IN | WEIGHT: 173.1 LBS | TEMPERATURE: 97.7 F | BODY MASS INDEX: 24.78 KG/M2 | RESPIRATION RATE: 16 BRPM | OXYGEN SATURATION: 100 % | HEART RATE: 75 BPM | SYSTOLIC BLOOD PRESSURE: 112 MMHG | DIASTOLIC BLOOD PRESSURE: 75 MMHG

## 2025-01-14 DIAGNOSIS — E11.9 TYPE 2 DIABETES MELLITUS WITHOUT COMPLICATION, WITHOUT LONG-TERM CURRENT USE OF INSULIN (H): Primary | ICD-10-CM

## 2025-01-14 DIAGNOSIS — E11.42 DIABETIC PERIPHERAL NEUROPATHY (H): ICD-10-CM

## 2025-01-14 DIAGNOSIS — F33.1 MAJOR DEPRESSIVE DISORDER, RECURRENT EPISODE, MODERATE (H): ICD-10-CM

## 2025-01-14 DIAGNOSIS — M79.10 MYALGIA: ICD-10-CM

## 2025-01-14 LAB
CREAT UR-MCNC: 135 MG/DL
CRP SERPL-MCNC: <3 MG/L
ERYTHROCYTE [SEDIMENTATION RATE] IN BLOOD BY WESTERGREN METHOD: 3 MM/HR (ref 0–20)
EST. AVERAGE GLUCOSE BLD GHB EST-MCNC: 108 MG/DL
HBA1C MFR BLD: 5.4 % (ref 0–5.6)
MICROALBUMIN UR-MCNC: 15.6 MG/L
MICROALBUMIN/CREAT UR: 11.56 MG/G CR (ref 0–17)

## 2025-01-14 PROCEDURE — 82570 ASSAY OF URINE CREATININE: CPT | Performed by: INTERNAL MEDICINE

## 2025-01-14 PROCEDURE — 85652 RBC SED RATE AUTOMATED: CPT | Performed by: INTERNAL MEDICINE

## 2025-01-14 PROCEDURE — G2211 COMPLEX E/M VISIT ADD ON: HCPCS | Performed by: INTERNAL MEDICINE

## 2025-01-14 PROCEDURE — 83036 HEMOGLOBIN GLYCOSYLATED A1C: CPT | Performed by: INTERNAL MEDICINE

## 2025-01-14 PROCEDURE — 99214 OFFICE O/P EST MOD 30 MIN: CPT | Performed by: INTERNAL MEDICINE

## 2025-01-14 PROCEDURE — 36415 COLL VENOUS BLD VENIPUNCTURE: CPT | Performed by: INTERNAL MEDICINE

## 2025-01-14 PROCEDURE — 82043 UR ALBUMIN QUANTITATIVE: CPT | Performed by: INTERNAL MEDICINE

## 2025-01-14 PROCEDURE — 86140 C-REACTIVE PROTEIN: CPT | Performed by: INTERNAL MEDICINE

## 2025-01-14 RX ORDER — VENLAFAXINE HYDROCHLORIDE 150 MG/1
150 CAPSULE, EXTENDED RELEASE ORAL DAILY
Qty: 90 CAPSULE | Refills: 1 | Status: SHIPPED | OUTPATIENT
Start: 2025-01-14

## 2025-01-14 ASSESSMENT — PAIN SCALES - GENERAL: PAINLEVEL_OUTOF10: NO PAIN (0)

## 2025-01-14 NOTE — PROGRESS NOTES
Assessment & Plan     Type 2 diabetes mellitus without complication, without long-term current use of insulin (H)  A1c is stellar.  He will continue Mounjaro unchanged.  Discontinue metformin.  - Hemoglobin A1c    Major depressive disorder, recurrent episode, moderate (H)  I do wonder if this is contributing to his fatigue symptoms.  Last visit we discussed increasing Effexor.  He is agreeable to this today.  We will increase to 150 mg daily.  Can consider adding SSRI.  - venlafaxine (EFFEXOR XR) 150 MG 24 hr capsule  Dispense: 90 capsule; Refill: 1    Diabetic peripheral neuropathy (H)  Unlikely that this is the cause of the fatigue or muscle aches.  He does have some diminished sensation.  His diabetes is well-controlled.    Myalgia  With fatigue.  Etiology is unclear.  Will check inflammatory markers today.  I suggest holding his atorvastatin as per AVS in the off chance this is contributing to his muscle aches.  - ESR: Erythrocyte sedimentation rate  - CRP, inflammation    The longitudinal plan of care for the diagnosis(es)/condition(s) as documented were addressed during this visit. Due to the added complexity in care, I will continue to support Bro in the subsequent management and with ongoing continuity of care.    Subjective   Bro is a 59 year old, presenting for the following health issues:  Follow Up        1/14/2025     8:19 AM   Additional Questions   Roomed by Nasreen CEBALLOS MA     DM  Medications noted. Has had drastic reduction of A1c with Mounjaro.  Sees clinical pharmacy as well.  Does have peripheral neuropathy.      He c/o muscle aches, fatigue.  This has been ongoing.    He does have sleep issues as well.  Working with a private sleep medicine here in the area.  Diagnosed with sleep apnea.  States he had recently underwent repeat sleep study and is hoping he may be able to discontinue CPAP machine.    He is also been started on medication for narcolepsy.  He feels this has helped him to be alert  "during the day but he still complains of excessive fatigue.    Conversely he also has trouble falling asleep.  He takes trazodone nightly.                   Objective    /75 (BP Location: Right arm, Patient Position: Sitting, Cuff Size: Adult Regular)   Pulse 75   Temp 97.7  F (36.5  C) (Oral)   Resp 16   Ht 1.778 m (5' 10\")   Wt 78.5 kg (173 lb 1.6 oz)   SpO2 100%   BMI 24.84 kg/m    Body mass index is 24.84 kg/m .  Wt Readings from Last 3 Encounters:   01/14/25 78.5 kg (173 lb 1.6 oz)   10/08/24 80.9 kg (178 lb 6.4 oz)   07/12/24 82 kg (180 lb 11.2 oz)       Physical Exam   GENERAL: alert and no distress  NECK: no adenopathy, no asymmetry, masses, or scars  RESP: lungs clear to auscultation - no rales, rhonchi or wheezes  CV: regular rate and rhythm, normal S1 S2, no S3 or S4, no murmur, click or rub, no peripheral edema  ABDOMEN: soft, nontender, no hepatosplenomegaly, no masses and bowel sounds normal  MS: no gross musculoskeletal defects noted, no edema  Diabetic foot exam: reduced sensation at bottoms            Signed Electronically by: Maciej Hopkins MD    "

## 2025-01-14 NOTE — PATIENT INSTRUCTIONS
LABS    VENLAFAXINE  Increase to 150mg.  I sent in an rx for 150mg pills, but you can take TWO of the 75mg pills you have to use them up.     STOP  Atorvastatin and Metformin    AFTER 2 weeks, if NO improvement in fatigue OR muscle aches, RESUME the Atorvastatin.

## 2025-01-26 DIAGNOSIS — E11.9 TYPE 2 DIABETES MELLITUS WITHOUT COMPLICATION, WITHOUT LONG-TERM CURRENT USE OF INSULIN (H): ICD-10-CM

## 2025-01-26 DIAGNOSIS — E11.42 DIABETIC PERIPHERAL NEUROPATHY (H): ICD-10-CM

## 2025-01-27 RX ORDER — METFORMIN HYDROCHLORIDE 500 MG/1
500 TABLET, EXTENDED RELEASE ORAL 2 TIMES DAILY WITH MEALS
Qty: 180 TABLET | Refills: 0 | Status: SHIPPED | OUTPATIENT
Start: 2025-01-27

## 2025-01-27 RX ORDER — ATORVASTATIN CALCIUM 20 MG/1
20 TABLET, FILM COATED ORAL DAILY
Qty: 90 TABLET | Refills: 1 | Status: SHIPPED | OUTPATIENT
Start: 2025-01-27

## 2025-02-26 ENCOUNTER — OFFICE VISIT (OUTPATIENT)
Dept: PHARMACY | Facility: CLINIC | Age: 60
End: 2025-02-26
Payer: COMMERCIAL

## 2025-02-26 DIAGNOSIS — F41.1 ANXIETY STATE: ICD-10-CM

## 2025-02-26 DIAGNOSIS — Z78.9 TAKES DIETARY SUPPLEMENTS: ICD-10-CM

## 2025-02-26 DIAGNOSIS — G47.00 INSOMNIA, UNSPECIFIED TYPE: ICD-10-CM

## 2025-02-26 DIAGNOSIS — E11.9 TYPE 2 DIABETES MELLITUS WITHOUT COMPLICATION, WITHOUT LONG-TERM CURRENT USE OF INSULIN (H): Primary | ICD-10-CM

## 2025-02-26 DIAGNOSIS — F33.1 MAJOR DEPRESSIVE DISORDER, RECURRENT EPISODE, MODERATE (H): ICD-10-CM

## 2025-02-26 DIAGNOSIS — R53.82 CHRONIC FATIGUE: ICD-10-CM

## 2025-02-26 PROCEDURE — 99607 MTMS BY PHARM ADDL 15 MIN: CPT

## 2025-02-26 PROCEDURE — 99606 MTMS BY PHARM EST 15 MIN: CPT

## 2025-02-26 NOTE — PROGRESS NOTES
Medication Therapy Management (MTM) Encounter    ASSESSMENT:                            Medication Adherence/Access: No issues identified.    Diabetes  Stable. Patient is meeting A1c goal of <7%.     Chronic Fatigue, Depression, Anxiety, Insomnia  Stable.    Supplements and Topicals  Stable.    PLAN:                            No changes today    Follow-up: with PCP on 6/16/25 and with MTM on 7/16/25    SUBJECTIVE/OBJECTIVE:                          Bro Barton is a 59 year old male seen for a follow-up visit.       Reason for visit: 3 month follow up, annual medication review.    Allergies/ADRs: Reviewed in chart  Past Medical History: Reviewed in chart  Tobacco: He reports that he has never smoked. He has never used smokeless tobacco.  Alcohol: none    Medication Adherence/Access: no issues reported.    Diabetes   Mounjaro 7.5 mg weekly  Atorvastatin 20 mg daily - holding  Stopped metformin after finishing supply in early January.  Feels Mounjaro is going well, reports no medication side effects.  Blood sugar monitoring: Never  Current diabetes symptoms: None  Has been holding atorvastatin for the past 6 weeks as recommended by PCP, doesn't think fatigue has improved since holding. Plans to hold until next follow up with PCP.  Eye exam is up to date  Foot exam: due     Mental Health (Chronic Fatigue, Depression, Anxiety, Insomnia)  Solriamfetol 150 mg daily as needed  Trazodone 50 mg at bedtime  Venlafaxine 75 mg daily  Takes solriamfetol first thing in the morning before work, is helpful. Doesn't take every day because it increases anxiety. Has tried alternatives in the past, but has found solriamfetol works best. Overall, mood is good. Doesn't think solriamfetol impacts sleep. Had a sleep evaluation recently and was recommended to stop using CPAP. Will instead use a pillow that prevents him from sleeping on his back.     Supplements and Topicals  Multivitamin 1 tablet daily (700 units vitamin D)  Turmeric 1  capsule daily for inflammation  Desonide 0.05% cream twice daily  No reported issues at this time.      Today's Vitals: There were no vitals taken for this visit.  ----------------    I spent 18 minutes with this patient today.     A summary of these recommendations was sent via [employer].    Rajendra Coronel PharmD (Hailie), MPH  Medication Therapy Management Pharmacist      Medication Therapy Recommendations  No medication therapy recommendations to display

## 2025-02-26 NOTE — PATIENT INSTRUCTIONS
"Recommendations from today's MTM visit:                                                    MTM (medication therapy management) is a service provided by a clinical pharmacist designed to help you get the most of out of your medicines.   Today we reviewed what your medicines are for, how to know if they are working, that your medicines are safe and how to make your medicine regimen as easy as possible.      No changes today    Follow-up: with PCP on 6/16/25 and with MTM on 7/16/25    It was great speaking with you today.  I value your experience and would be very thankful for your time in providing feedback in our clinic survey. In the next few days, you may receive an email or text message from [employer] with a link to a survey related to your  clinical pharmacist.\"     To schedule another MTM appointment, please call the clinic directly or you may call the MTM scheduling line at [phone] or toll-free at [phone].     My Clinical Pharmacist's contact information:                                                      Please feel free to contact me with any questions or concerns you have.      Rajendra Coronel (Hailie), WilianD, MPH  Medication Therapy Management Pharmacist    "

## 2025-04-29 DIAGNOSIS — E11.9 TYPE 2 DIABETES MELLITUS WITHOUT COMPLICATION, WITHOUT LONG-TERM CURRENT USE OF INSULIN (H): ICD-10-CM

## 2025-04-30 RX ORDER — TIRZEPATIDE 7.5 MG/.5ML
INJECTION, SOLUTION SUBCUTANEOUS
Qty: 6 ML | Refills: 1 | Status: SHIPPED | OUTPATIENT
Start: 2025-04-30

## 2025-05-30 DIAGNOSIS — F33.1 MAJOR DEPRESSIVE DISORDER, RECURRENT EPISODE, MODERATE (H): ICD-10-CM

## 2025-06-02 RX ORDER — TRAZODONE HYDROCHLORIDE 50 MG/1
50 TABLET ORAL AT BEDTIME
Qty: 90 TABLET | Refills: 3 | Status: SHIPPED | OUTPATIENT
Start: 2025-06-02

## 2025-06-15 SDOH — HEALTH STABILITY: PHYSICAL HEALTH: ON AVERAGE, HOW MANY MINUTES DO YOU ENGAGE IN EXERCISE AT THIS LEVEL?: 40 MIN

## 2025-06-15 SDOH — HEALTH STABILITY: PHYSICAL HEALTH: ON AVERAGE, HOW MANY DAYS PER WEEK DO YOU ENGAGE IN MODERATE TO STRENUOUS EXERCISE (LIKE A BRISK WALK)?: 4 DAYS

## 2025-06-15 ASSESSMENT — SOCIAL DETERMINANTS OF HEALTH (SDOH): HOW OFTEN DO YOU GET TOGETHER WITH FRIENDS OR RELATIVES?: ONCE A WEEK

## 2025-06-15 ASSESSMENT — PATIENT HEALTH QUESTIONNAIRE - PHQ9
SUM OF ALL RESPONSES TO PHQ QUESTIONS 1-9: 11
10. IF YOU CHECKED OFF ANY PROBLEMS, HOW DIFFICULT HAVE THESE PROBLEMS MADE IT FOR YOU TO DO YOUR WORK, TAKE CARE OF THINGS AT HOME, OR GET ALONG WITH OTHER PEOPLE: SOMEWHAT DIFFICULT
SUM OF ALL RESPONSES TO PHQ QUESTIONS 1-9: 11

## 2025-06-16 ENCOUNTER — OFFICE VISIT (OUTPATIENT)
Dept: FAMILY MEDICINE | Facility: CLINIC | Age: 60
End: 2025-06-16
Payer: COMMERCIAL

## 2025-06-16 ENCOUNTER — RESULTS FOLLOW-UP (OUTPATIENT)
Dept: FAMILY MEDICINE | Facility: CLINIC | Age: 60
End: 2025-06-16

## 2025-06-16 VITALS
RESPIRATION RATE: 16 BRPM | OXYGEN SATURATION: 100 % | BODY MASS INDEX: 25.62 KG/M2 | SYSTOLIC BLOOD PRESSURE: 118 MMHG | HEIGHT: 69 IN | DIASTOLIC BLOOD PRESSURE: 72 MMHG | TEMPERATURE: 97.1 F | WEIGHT: 173 LBS | HEART RATE: 83 BPM

## 2025-06-16 DIAGNOSIS — E11.42 DIABETIC PERIPHERAL NEUROPATHY (H): ICD-10-CM

## 2025-06-16 DIAGNOSIS — E78.5 DYSLIPIDEMIA: ICD-10-CM

## 2025-06-16 DIAGNOSIS — E11.9 TYPE 2 DIABETES MELLITUS WITHOUT COMPLICATION, WITHOUT LONG-TERM CURRENT USE OF INSULIN (H): Primary | ICD-10-CM

## 2025-06-16 DIAGNOSIS — Z00.00 ENCOUNTER FOR PREVENTIVE CARE: Primary | ICD-10-CM

## 2025-06-16 DIAGNOSIS — E11.9 TYPE 2 DIABETES MELLITUS WITHOUT COMPLICATION, WITHOUT LONG-TERM CURRENT USE OF INSULIN (H): ICD-10-CM

## 2025-06-16 DIAGNOSIS — R53.83 OTHER FATIGUE: ICD-10-CM

## 2025-06-16 DIAGNOSIS — F33.1 MAJOR DEPRESSIVE DISORDER, RECURRENT EPISODE, MODERATE (H): ICD-10-CM

## 2025-06-16 LAB
ALBUMIN SERPL BCG-MCNC: 4.5 G/DL (ref 3.5–5.2)
ALP SERPL-CCNC: 47 U/L (ref 40–150)
ALT SERPL W P-5'-P-CCNC: 15 U/L (ref 0–70)
ANION GAP SERPL CALCULATED.3IONS-SCNC: 11 MMOL/L (ref 7–15)
AST SERPL W P-5'-P-CCNC: 21 U/L (ref 0–45)
BASOPHILS # BLD AUTO: 0.1 10E3/UL (ref 0–0.2)
BASOPHILS NFR BLD AUTO: 1 %
BILIRUB SERPL-MCNC: 0.4 MG/DL
BUN SERPL-MCNC: 14.5 MG/DL (ref 8–23)
CALCIUM SERPL-MCNC: 9.5 MG/DL (ref 8.8–10.4)
CHLORIDE SERPL-SCNC: 107 MMOL/L (ref 98–107)
CHOLEST SERPL-MCNC: 131 MG/DL
CREAT SERPL-MCNC: 1 MG/DL (ref 0.67–1.17)
EGFRCR SERPLBLD CKD-EPI 2021: 85 ML/MIN/1.73M2
EOSINOPHIL # BLD AUTO: 0.2 10E3/UL (ref 0–0.7)
EOSINOPHIL NFR BLD AUTO: 4 %
ERYTHROCYTE [DISTWIDTH] IN BLOOD BY AUTOMATED COUNT: 13.1 % (ref 10–15)
FASTING STATUS PATIENT QL REPORTED: YES
FASTING STATUS PATIENT QL REPORTED: YES
GLUCOSE SERPL-MCNC: 101 MG/DL (ref 70–99)
HCO3 SERPL-SCNC: 26 MMOL/L (ref 22–29)
HCT VFR BLD AUTO: 38 % (ref 40–53)
HDLC SERPL-MCNC: 54 MG/DL
HGB BLD-MCNC: 12.1 G/DL (ref 13.3–17.7)
IMM GRANULOCYTES # BLD: 0 10E3/UL
IMM GRANULOCYTES NFR BLD: 0 %
LDLC SERPL CALC-MCNC: 69 MG/DL
LYMPHOCYTES # BLD AUTO: 1.6 10E3/UL (ref 0.8–5.3)
LYMPHOCYTES NFR BLD AUTO: 29 %
MCH RBC QN AUTO: 28.5 PG (ref 26.5–33)
MCHC RBC AUTO-ENTMCNC: 31.8 G/DL (ref 31.5–36.5)
MCV RBC AUTO: 90 FL (ref 78–100)
MONOCYTES # BLD AUTO: 0.5 10E3/UL (ref 0–1.3)
MONOCYTES NFR BLD AUTO: 10 %
NEUTROPHILS # BLD AUTO: 2.9 10E3/UL (ref 1.6–8.3)
NEUTROPHILS NFR BLD AUTO: 55 %
NONHDLC SERPL-MCNC: 77 MG/DL
PLATELET # BLD AUTO: 289 10E3/UL (ref 150–450)
POTASSIUM SERPL-SCNC: 4.2 MMOL/L (ref 3.4–5.3)
PROT SERPL-MCNC: 7 G/DL (ref 6.4–8.3)
PSA SERPL DL<=0.01 NG/ML-MCNC: 3.42 NG/ML (ref 0–4.5)
RBC # BLD AUTO: 4.24 10E6/UL (ref 4.4–5.9)
SODIUM SERPL-SCNC: 144 MMOL/L (ref 135–145)
TRIGL SERPL-MCNC: 42 MG/DL
TSH SERPL DL<=0.005 MIU/L-ACNC: 1.22 UIU/ML (ref 0.3–4.2)
VIT B12 SERPL-MCNC: 845 PG/ML (ref 232–1245)
VIT D+METAB SERPL-MCNC: 43 NG/ML (ref 20–50)
WBC # BLD AUTO: 5.3 10E3/UL (ref 4–11)

## 2025-06-16 PROCEDURE — 96127 BRIEF EMOTIONAL/BEHAV ASSMT: CPT | Performed by: INTERNAL MEDICINE

## 2025-06-16 PROCEDURE — 99214 OFFICE O/P EST MOD 30 MIN: CPT | Mod: 25 | Performed by: INTERNAL MEDICINE

## 2025-06-16 PROCEDURE — 99396 PREV VISIT EST AGE 40-64: CPT | Performed by: INTERNAL MEDICINE

## 2025-06-16 PROCEDURE — 82607 VITAMIN B-12: CPT | Performed by: INTERNAL MEDICINE

## 2025-06-16 PROCEDURE — 1125F AMNT PAIN NOTED PAIN PRSNT: CPT | Performed by: INTERNAL MEDICINE

## 2025-06-16 PROCEDURE — G0103 PSA SCREENING: HCPCS | Performed by: INTERNAL MEDICINE

## 2025-06-16 PROCEDURE — 3078F DIAST BP <80 MM HG: CPT | Performed by: INTERNAL MEDICINE

## 2025-06-16 PROCEDURE — 80053 COMPREHEN METABOLIC PANEL: CPT | Performed by: INTERNAL MEDICINE

## 2025-06-16 PROCEDURE — 84443 ASSAY THYROID STIM HORMONE: CPT | Performed by: INTERNAL MEDICINE

## 2025-06-16 PROCEDURE — 83036 HEMOGLOBIN GLYCOSYLATED A1C: CPT | Performed by: INTERNAL MEDICINE

## 2025-06-16 PROCEDURE — 80061 LIPID PANEL: CPT | Performed by: INTERNAL MEDICINE

## 2025-06-16 PROCEDURE — 36415 COLL VENOUS BLD VENIPUNCTURE: CPT | Performed by: INTERNAL MEDICINE

## 2025-06-16 PROCEDURE — 85025 COMPLETE CBC W/AUTO DIFF WBC: CPT | Performed by: INTERNAL MEDICINE

## 2025-06-16 PROCEDURE — 82306 VITAMIN D 25 HYDROXY: CPT | Performed by: INTERNAL MEDICINE

## 2025-06-16 PROCEDURE — 3074F SYST BP LT 130 MM HG: CPT | Performed by: INTERNAL MEDICINE

## 2025-06-16 RX ORDER — ATORVASTATIN CALCIUM 20 MG/1
20 TABLET, FILM COATED ORAL DAILY
Status: SHIPPED
Start: 2025-06-16

## 2025-06-16 RX ORDER — VENLAFAXINE HYDROCHLORIDE 150 MG/1
150 CAPSULE, EXTENDED RELEASE ORAL DAILY
Qty: 90 CAPSULE | Refills: 1 | Status: SHIPPED | OUTPATIENT
Start: 2025-06-16

## 2025-06-16 ASSESSMENT — PAIN SCALES - GENERAL: PAINLEVEL_OUTOF10: MILD PAIN (3)

## 2025-06-16 NOTE — PROGRESS NOTES
"Preventive Care Visit  [facility]  Maciej Hopkins MD, Internal Medicine  Jun 16, 2025      Assessment & Plan     Bro Barton is a 60-year-old male with a history of major depressive disorder, dyslipidemia, type 2 diabetes mellitus, and chronic fatigue. His current management plan includes medication adjustments and further laboratory evaluations to address ongoing symptoms and monitor chronic conditions.    Major depressive disorder, recurrent episode, moderate:  - No change in symptoms with the current dose of venlafaxine. no better or worse on higher dose.  will leave at current dose.  Rx sent.  - Continue venlafaxine at the current lower dose.    Dyslipidemia:  - Muscle fatigue was previously suspected to be related to atorvastatin use. Discontinuation did not change symptoms.  - Resume atorvastatin. Prescription not sent as patient has supply at home.    Diabetic peripheral neuropathy:  - Blood sugars have been stable with A1c under six.  - Continue current management. Perform A1c test today.    Other fatigue:  - Chronic fatigue persists without clear etiology. Previous tests for autoimmune disorders and anemia were normal. Thyroid function has been normal.  - Check vitamin D and B12 levels, along with basic electrolytes, liver, kidneys, cholesterol, prostate, and thyroid function.    Consent was obtained from the patient to use an AI documentation tool in the creation of this note.            BMI  Estimated body mass index is 25.55 kg/m  as calculated from the following:    Height as of this encounter: 1.753 m (5' 9\").    Weight as of this encounter: 78.5 kg (173 lb).       Counseling  Appropriate preventive services were addressed with this patient via screening, questionnaire, or discussion as appropriate for fall prevention, nutrition, physical activity, Tobacco-use cessation, social engagement, weight loss and cognition.  Checklist reviewing preventive services available has been " given to the patient.  Reviewed patient's diet, addressing concerns and/or questions.   He is at risk for psychosocial distress and has been provided with information to reduce risk.   The patient's PHQ-9 score is consistent with moderate depression. He was provided with information regarding depression.           Emilie Crabtree is a 60 year old, presenting for the following:  Physical        6/16/2025    11:10 AM   Additional Questions   Roomed by Krysta ZAPATA    Here for preventive care also.      Bro Barton, age: 60 years    Fatigue  Bro Barton reports experiencing fatigue for approximately 25 years, which began after he started having children. He describes the fatigue as persistent throughout the day, feeling tired upon waking and continuing to feel tired all day. Despite being able to perform general chores and walk, there are times when he feels the need to return to bed due to exhaustion. Previous evaluations have not identified a clear cause for the fatigue, and he has undergone sleep tests, which led to discontinuation of CPAP therapy due to significant weight loss. However, this change has not improved his fatigue symptoms.                    6/15/2025   General Health   How would you rate your overall physical health? Good   Feel stress (tense, anxious, or unable to sleep) Rather much   (!) STRESS CONCERN      6/15/2025   Nutrition   Three or more servings of calcium each day? (!) NO   Diet: Regular (no restrictions)   How many servings of fruit and vegetables per day? (!) 0-1   How many sweetened beverages each day? 0-1         6/15/2025   Exercise   Days per week of moderate/strenous exercise 4 days   Average minutes spent exercising at this level 40 min         6/15/2025   Social Factors   Frequency of gathering with friends or relatives Once a week   Worry food won't last until get money to buy more No   Food not last or not have enough money for food? No   Do you have housing? (Housing  "is defined as stable permanent housing and does not include staying outside in a car, in a tent, in an abandoned building, in an overnight shelter, or couch-surfing.) Yes   Are you worried about losing your housing? No   Lack of transportation? No   Unable to get utilities (heat,electricity)? No         6/15/2025   Fall Risk   Fallen 2 or more times in the past year? No   Trouble with walking or balance? No          6/15/2025   Dental   Dentist two times every year? Yes       Today's PHQ-9 Score:       6/15/2025     9:55 PM   PHQ-9 SCORE   PHQ-9 Total Score MyChart 11 (Moderate depression)   PHQ-9 Total Score 11        Patient-reported         6/15/2025   Substance Use   Alcohol more than 3/day or more than 7/wk No   Do you use any other substances recreationally? No     Social History     Tobacco Use    Smoking status: Never    Smokeless tobacco: Never   Vaping Use    Vaping status: Never Used   Substance Use Topics    Alcohol use: No    Drug use: No           6/15/2025   STI Screening   New sexual partner(s) since last STI/HIV test? No   Last PSA:   Prostate Specific Antigen Screen   Date Value Ref Range Status   06/10/2024 1.83 0.00 - 4.50 ng/mL Final     ASCVD Risk   The 10-year ASCVD risk score (Krysten CHOE, et al., 2019) is: 11.1%    Values used to calculate the score:      Age: 60 years      Sex: Male      Is Non- : No      Diabetic: Yes      Tobacco smoker: No      Systolic Blood Pressure: 118 mmHg      Is BP treated: No      HDL Cholesterol: 54 mg/dL      Total Cholesterol: 131 mg/dL           Reviewed and updated as needed this visit by Provider                             Objective    Exam  /72 (BP Location: Left arm, Patient Position: Chair, Cuff Size: Adult Regular)   Pulse 83   Temp 97.1  F (36.2  C) (Temporal)   Resp 16   Ht 1.753 m (5' 9\")   Wt 78.5 kg (173 lb)   SpO2 100%   BMI 25.55 kg/m     Estimated body mass index is 25.55 kg/m  as calculated from the " "following:    Height as of this encounter: 1.753 m (5' 9\").    Weight as of this encounter: 78.5 kg (173 lb).  Wt Readings from Last 3 Encounters:   06/16/25 78.5 kg (173 lb)   01/14/25 78.5 kg (173 lb 1.6 oz)   10/08/24 80.9 kg (178 lb 6.4 oz)         Physical Exam  GENERAL: alert and no distress  EYES: Eyes grossly normal to inspection, PERRL and conjunctivae and sclerae normal  HENT: ear canals and TM's normal, nose and mouth without ulcers or lesions  NECK: no adenopathy, no asymmetry, masses, or scars  RESP: lungs clear to auscultation - no rales, rhonchi or wheezes  CV: regular rate and rhythm, normal S1 S2, no S3 or S4, no murmur, click or rub, no peripheral edema  ABDOMEN: soft, nontender, no hepatosplenomegaly, no masses and bowel sounds normal  MS: no gross musculoskeletal defects noted, no edema  SKIN: no suspicious lesions or rashes  NEURO: Normal strength and tone, mentation intact and speech normal  PSYCH: mentation appears normal, affect normal/bright        Signed Electronically by: Maciej Hopkins MD    Answers submitted by the patient for this visit:  Patient Health Questionnaire (Submitted on 6/15/2025)  If you checked off any problems, how difficult have these problems made it for you to do your work, take care of things at home, or get along with other people?: Somewhat difficult  PHQ9 TOTAL SCORE: 11    "

## 2025-06-17 LAB
EST. AVERAGE GLUCOSE BLD GHB EST-MCNC: 103 MG/DL
HBA1C MFR BLD: 5.2 % (ref 0–5.6)

## 2025-06-17 NOTE — RESULT ENCOUNTER NOTE
Bro,  Most labs are stable.  Your hemoglobin IS down a little, indicating a mild anemia.  It's been normal in years past so certainly not the cause of the fatigue, but warrants monitoring.  I'd like to see you in 3-4 months.  We will recheck this and a few other things to workup the mild anemia.   Maciej Hopkins MD on 6/16/2025 at 9:01 PM

## (undated) RX ORDER — FENTANYL CITRATE 50 UG/ML
INJECTION, SOLUTION INTRAMUSCULAR; INTRAVENOUS
Status: DISPENSED
Start: 2023-06-15